# Patient Record
Sex: MALE | Race: BLACK OR AFRICAN AMERICAN | Employment: PART TIME | URBAN - METROPOLITAN AREA
[De-identification: names, ages, dates, MRNs, and addresses within clinical notes are randomized per-mention and may not be internally consistent; named-entity substitution may affect disease eponyms.]

---

## 2018-01-22 ENCOUNTER — APPOINTMENT (OUTPATIENT)
Dept: GENERAL RADIOLOGY | Age: 48
End: 2018-01-22
Attending: EMERGENCY MEDICINE
Payer: SELF-PAY

## 2018-01-22 ENCOUNTER — HOSPITAL ENCOUNTER (EMERGENCY)
Age: 48
Discharge: HOME | End: 2018-01-22
Attending: EMERGENCY MEDICINE | Admitting: EMERGENCY MEDICINE
Payer: SELF-PAY

## 2018-01-22 VITALS
BODY MASS INDEX: 25.48 KG/M2 | WEIGHT: 172 LBS | SYSTOLIC BLOOD PRESSURE: 124 MMHG | RESPIRATION RATE: 18 BRPM | HEART RATE: 93 BPM | HEIGHT: 69 IN | TEMPERATURE: 98.2 F | OXYGEN SATURATION: 98 % | DIASTOLIC BLOOD PRESSURE: 78 MMHG

## 2018-01-22 DIAGNOSIS — M25.561 ACUTE PAIN OF RIGHT KNEE: Primary | ICD-10-CM

## 2018-01-22 DIAGNOSIS — M25.461 EFFUSION OF RIGHT KNEE: ICD-10-CM

## 2018-01-22 PROCEDURE — 99282 EMERGENCY DEPT VISIT SF MDM: CPT

## 2018-01-22 PROCEDURE — 73564 X-RAY EXAM KNEE 4 OR MORE: CPT

## 2018-01-22 RX ORDER — HYDROCODONE BITARTRATE AND ACETAMINOPHEN 5; 325 MG/1; MG/1
TABLET ORAL
Qty: 8 TAB | Refills: 0 | Status: SHIPPED | OUTPATIENT
Start: 2018-01-22 | End: 2019-04-29 | Stop reason: ALTCHOICE

## 2018-01-22 NOTE — DISCHARGE INSTRUCTIONS
Knee Pain or Injury: Care Instructions  Your Care Instructions    Injuries are a common cause of knee problems. Sudden (acute) injuries may be caused by a direct blow to the knee. They can also be caused by abnormal twisting, bending, or falling on the knee. Pain, bruising, or swelling may be severe, and may start within minutes of the injury. Overuse is another cause of knee pain. Other causes are climbing stairs, kneeling, and other activities that use the knee. Everyday wear and tear, especially as you get older, also can cause knee pain. Rest, along with home treatment, often relieves pain and allows your knee to heal. If you have a serious knee injury, you may need tests and treatment. Follow-up care is a key part of your treatment and safety. Be sure to make and go to all appointments, and call your doctor if you are having problems. It's also a good idea to know your test results and keep a list of the medicines you take. How can you care for yourself at home? · Be safe with medicines. Read and follow all instructions on the label. ¨ If the doctor gave you a prescription medicine for pain, take it as prescribed. ¨ If you are not taking a prescription pain medicine, ask your doctor if you can take an over-the-counter medicine. · Rest and protect your knee. Take a break from any activity that may cause pain. · Put ice or a cold pack on your knee for 10 to 20 minutes at a time. Put a thin cloth between the ice and your skin. · Prop up a sore knee on a pillow when you ice it or anytime you sit or lie down for the next 3 days. Try to keep it above the level of your heart. This will help reduce swelling. · If your knee is not swollen, you can put moist heat, a heating pad, or a warm cloth on your knee. · If your doctor recommends an elastic bandage, sleeve, or other type of support for your knee, wear it as directed.   · Follow your doctor's instructions about how much weight you can put on your leg. Use a cane, crutches, or a walker as instructed. · Follow your doctor's instructions about activity during your healing process. If you can do mild exercise, slowly increase your activity. · Reach and stay at a healthy weight. Extra weight can strain the joints, especially the knees and hips, and make the pain worse. Losing even a few pounds may help. When should you call for help? Call 911 anytime you think you may need emergency care. For example, call if:  ? · You have symptoms of a blood clot in your lung (called a pulmonary embolism). These may include:  ¨ Sudden chest pain. ¨ Trouble breathing. ¨ Coughing up blood. ?Call your doctor now or seek immediate medical care if:  ? · You have severe or increasing pain. ? · Your leg or foot turns cold or changes color. ? · You cannot stand or put weight on your knee. ? · Your knee looks twisted or bent out of shape. ? · You cannot move your knee. ? · You have signs of infection, such as:  ¨ Increased pain, swelling, warmth, or redness. ¨ Red streaks leading from the knee. ¨ Pus draining from a place on your knee. ¨ A fever. ? · You have signs of a blood clot in your leg (called a deep vein thrombosis), such as:  ¨ Pain in your calf, back of the knee, thigh, or groin. ¨ Redness and swelling in your leg or groin. ? Watch closely for changes in your health, and be sure to contact your doctor if:  ? · You have tingling, weakness, or numbness in your knee. ? · You have any new symptoms, such as swelling. ? · You have bruises from a knee injury that last longer than 2 weeks. ? · You do not get better as expected. Where can you learn more? Go to http://leonides-mason.info/. Enter K195 in the search box to learn more about \"Knee Pain or Injury: Care Instructions. \"  Current as of: March 20, 2017  Content Version: 11.4  © 2947-0127 [employer].  Care instructions adapted under license by [employer] (which disclaims liability or warranty for this information). If you have questions about a medical condition or this instruction, always ask your healthcare professional. [address] any warranty or liability for your use of this information. Joint Pain: Care Instructions  Your Care Instructions    Many people have small aches and pains from overuse or injury to muscles and joints. Joint injuries often happen during sports or recreation, work tasks, or projects around the home. An overuse injury can happen when you put too much stress on a joint or when you do an activity that stresses the joint over and over, such as using the computer or rowing a boat. You can take action at home to help your muscles and joints get better. You should feel better in 1 to 2 weeks, but it can take 3 months or more to heal completely. Follow-up care is a key part of your treatment and safety. Be sure to make and go to all appointments, and call your doctor if you are having problems. It's also a good idea to know your test results and keep a list of the medicines you take. How can you care for yourself at home? · Do not put weight on the injured joint for at least a day or two. · For the first day or two after an injury, do not take hot showers or baths, and do not use hot packs. The heat could make swelling worse. · Put ice or a cold pack on the sore joint for 10 to 20 minutes at a time. Try to do this every 1 to 2 hours for the next 3 days (when you are awake) or until the swelling goes down. Put a thin cloth between the ice and your skin. · Wrap the injury in an elastic bandage. Do not wrap it too tightly because this can cause more swelling. · Prop up the sore joint on a pillow when you ice it or anytime you sit or lie down during the next 3 days. Try to keep it above the level of your heart. This will help reduce swelling.   · Take an over-the-counter pain medicine, such as acetaminophen (Tylenol), ibuprofen (Advil, Motrin), or naproxen (Aleve). Read and follow all instructions on the label. · After 1 or 2 days of rest, begin moving the joint gently. While the joint is still healing, you can begin to exercise using activities that do not strain or hurt the painful joint. When should you call for help? Call your doctor now or seek immediate medical care if:  ? · You have signs of infection, such as:  ¨ Increased pain, swelling, warmth, and redness. ¨ Red streaks leading from the joint. ¨ A fever. ? Watch closely for changes in your health, and be sure to contact your doctor if:  ? · Your movement or symptoms are not getting better after 1 to 2 weeks of home treatment. Where can you learn more? Go to http://leonides-mason.info/. Enter P205 in the search box to learn more about \"Joint Pain: Care Instructions. \"  Current as of: March 21, 2017  Content Version: 11.4  © 0129-6211 [employer]. Care instructions adapted under license by [employer] (which disclaims liability or warranty for this information). If you have questions about a medical condition or this instruction, always ask your healthcare professional. [place] [address] any warranty or liability for your use of this information. Knee Pain or Injury: Care Instructions  Your Care Instructions    Injuries are a common cause of knee problems. Sudden (acute) injuries may be caused by a direct blow to the knee. They can also be caused by abnormal twisting, bending, or falling on the knee. Pain, bruising, or swelling may be severe, and may start within minutes of the injury. Overuse is another cause of knee pain. Other causes are climbing stairs, kneeling, and other activities that use the knee. Everyday wear and tear, especially as you get older, also can cause knee pain.   Rest, along with home treatment, often relieves pain and allows your knee to heal. If you have a serious knee injury, you may need tests and treatment. Follow-up care is a key part of your treatment and safety. Be sure to make and go to all appointments, and call your doctor if you are having problems. It's also a good idea to know your test results and keep a list of the medicines you take. How can you care for yourself at home? · Be safe with medicines. Read and follow all instructions on the label. ¨ If the doctor gave you a prescription medicine for pain, take it as prescribed. ¨ If you are not taking a prescription pain medicine, ask your doctor if you can take an over-the-counter medicine. · Rest and protect your knee. Take a break from any activity that may cause pain. · Put ice or a cold pack on your knee for 10 to 20 minutes at a time. Put a thin cloth between the ice and your skin. · Prop up a sore knee on a pillow when you ice it or anytime you sit or lie down for the next 3 days. Try to keep it above the level of your heart. This will help reduce swelling. · If your knee is not swollen, you can put moist heat, a heating pad, or a warm cloth on your knee. · If your doctor recommends an elastic bandage, sleeve, or other type of support for your knee, wear it as directed. · Follow your doctor's instructions about how much weight you can put on your leg. Use a cane, crutches, or a walker as instructed. · Follow your doctor's instructions about activity during your healing process. If you can do mild exercise, slowly increase your activity. · Reach and stay at a healthy weight. Extra weight can strain the joints, especially the knees and hips, and make the pain worse. Losing even a few pounds may help. When should you call for help? Call 911 anytime you think you may need emergency care. For example, call if:  ? · You have symptoms of a blood clot in your lung (called a pulmonary embolism). These may include:  ¨ Sudden chest pain. ¨ Trouble breathing. ¨ Coughing up blood.    ?Call your doctor now or seek immediate medical care if:  ? · You have severe or increasing pain. ? · Your leg or foot turns cold or changes color. ? · You cannot stand or put weight on your knee. ? · Your knee looks twisted or bent out of shape. ? · You cannot move your knee. ? · You have signs of infection, such as:  ¨ Increased pain, swelling, warmth, or redness. ¨ Red streaks leading from the knee. ¨ Pus draining from a place on your knee. ¨ A fever. ? · You have signs of a blood clot in your leg (called a deep vein thrombosis), such as:  ¨ Pain in your calf, back of the knee, thigh, or groin. ¨ Redness and swelling in your leg or groin. ? Watch closely for changes in your health, and be sure to contact your doctor if:  ? · You have tingling, weakness, or numbness in your knee. ? · You have any new symptoms, such as swelling. ? · You have bruises from a knee injury that last longer than 2 weeks. ? · You do not get better as expected. Where can you learn more? Go to http://leonides-mason.info/. Enter K195 in the search box to learn more about \"Knee Pain or Injury: Care Instructions. \"  Current as of: March 20, 2017  Content Version: 11.4  © 9324-0888 [employer]. Care instructions adapted under license by [employer] (which disclaims liability or warranty for this information). If you have questions about a medical condition or this instruction, always ask your healthcare professional. [place] [address] any warranty or liability for your use of this information.

## 2018-01-22 NOTE — ED PROVIDER NOTES
Patient is a 49 y.o. male presenting with knee pain. The history is provided by the patient. Knee Pain    This is a chronic problem. Episode onset: 3 weeks. The problem occurs constantly. The problem has not changed since onset. The pain is present in the right knee. The quality of the pain is described as aching. The pain is moderate. Associated symptoms include limited range of motion. Pertinent negatives include no numbness, no stiffness, no tingling, no itching, no back pain and no neck pain. The symptoms are aggravated by heat. He has tried nothing for the symptoms. The treatment provided no relief. There has been no history of extremity trauma. History reviewed. No pertinent past medical history. History reviewed. No pertinent surgical history. Family History:   Problem Relation Age of Onset    Stroke Mother     Hypertension Mother     No Known Problems Father        Social History     Social History    Marital status: SINGLE     Spouse name: N/A    Number of children: N/A    Years of education: N/A     Occupational History    Not on file. Social History Main Topics    Smoking status: Light Tobacco Smoker    Smokeless tobacco: Never Used    Alcohol use Yes    Drug use: No    Sexual activity: Not on file     Other Topics Concern    Not on file     Social History Narrative         ALLERGIES: Review of patient's allergies indicates no known allergies. Review of Systems   Musculoskeletal: Negative for back pain, neck pain and stiffness. Skin: Negative for itching. Neurological: Negative for tingling and numbness. Vitals:    01/22/18 1106   BP: 124/78   Pulse: 93   Resp: 18   Temp: 98.2 °F (36.8 °C)   SpO2: 98%   Weight: 78 kg (172 lb)   Height: 5' 9\" (1.753 m)            Physical Exam   Constitutional: He is oriented to person, place, and time. He appears well-developed and well-nourished. HENT:   Head: Normocephalic and atraumatic.    Mouth/Throat: Oropharynx is clear and moist.   Eyes: Conjunctivae are normal.   Neck: Normal range of motion. Cardiovascular: Normal rate, regular rhythm and normal heart sounds. Pulmonary/Chest: Effort normal. No respiratory distress. He has no wheezes. He has no rales. Abdominal: Soft. He exhibits no distension. Musculoskeletal:        Right knee: He exhibits bony tenderness. He exhibits normal range of motion, no swelling, no effusion, no ecchymosis, no deformity, no laceration, no erythema, normal alignment, no LCL laxity, normal patellar mobility, normal meniscus and no MCL laxity. No tenderness found. Limited ROM due to pain, no redness, swelling or concern for joint infection, effusion  Ligaments stable. All tests of knee WNL. Neurological: He is oriented to person, place, and time. Nursing note and vitals reviewed. MDM  Number of Diagnoses or Management Options  Acute pain of right knee:   Effusion of right knee:   Diagnosis management comments: Xray reviewed, effusion noted on xray, no fracutre/dislocation. Ace wrap, norco for pain (few tabs) and pcp f/u. Will also refer to ortho for further work up/management.         Amount and/or Complexity of Data Reviewed  Tests in the radiology section of CPT®: reviewed and ordered      ED Course       Procedures

## 2019-01-03 ENCOUNTER — HOSPITAL ENCOUNTER (EMERGENCY)
Age: 49
Discharge: HOME | End: 2019-01-03
Attending: EMERGENCY MEDICINE
Payer: COMMERCIAL

## 2019-01-03 ENCOUNTER — APPOINTMENT (OUTPATIENT)
Dept: GENERAL RADIOLOGY | Age: 49
End: 2019-01-03
Attending: PHYSICIAN ASSISTANT
Payer: COMMERCIAL

## 2019-01-03 VITALS
HEART RATE: 93 BPM | BODY MASS INDEX: 26.36 KG/M2 | DIASTOLIC BLOOD PRESSURE: 93 MMHG | OXYGEN SATURATION: 99 % | HEIGHT: 69 IN | WEIGHT: 178 LBS | RESPIRATION RATE: 17 BRPM | TEMPERATURE: 98.4 F | SYSTOLIC BLOOD PRESSURE: 143 MMHG

## 2019-01-03 DIAGNOSIS — J20.9 ACUTE BRONCHITIS, UNSPECIFIED ORGANISM: Primary | ICD-10-CM

## 2019-01-03 DIAGNOSIS — R68.89 FLU-LIKE SYMPTOMS: ICD-10-CM

## 2019-01-03 LAB
FLUAV AG NPH QL IA: NEGATIVE
FLUBV AG NOSE QL IA: NEGATIVE

## 2019-01-03 PROCEDURE — 71046 X-RAY EXAM CHEST 2 VIEWS: CPT

## 2019-01-03 PROCEDURE — 87804 INFLUENZA ASSAY W/OPTIC: CPT

## 2019-01-03 PROCEDURE — 99283 EMERGENCY DEPT VISIT LOW MDM: CPT

## 2019-01-03 RX ORDER — AZITHROMYCIN 250 MG/1
TABLET, FILM COATED ORAL
Qty: 6 TAB | Refills: 0 | Status: SHIPPED | OUTPATIENT
Start: 2019-01-03 | End: 2019-04-29 | Stop reason: ALTCHOICE

## 2019-01-03 RX ORDER — CODEINE PHOSPHATE AND GUAIFENESIN 10; 100 MG/5ML; MG/5ML
SOLUTION ORAL
Qty: 120 ML | Refills: 0 | Status: SHIPPED | OUTPATIENT
Start: 2019-01-03 | End: 2019-04-29 | Stop reason: ALTCHOICE

## 2019-01-03 NOTE — ED TRIAGE NOTES
Pt arrived c/o sore throat, generalized body aches, feels like ears hurt. States he is having sweats and chills, took nyquil and tylenol at home.

## 2019-01-03 NOTE — DISCHARGE INSTRUCTIONS
Patient Education        Bronchitis: Care Instructions  Your Care Instructions    Bronchitis is inflammation of the bronchial tubes, which carry air to the lungs. The tubes swell and produce mucus, or phlegm. The mucus and inflamed bronchial tubes make you cough. You may have trouble breathing. Most cases of bronchitis are caused by viruses like those that cause colds. Antibiotics usually do not help and they may be harmful. Bronchitis usually develops rapidly and lasts about 2 to 3 weeks in otherwise healthy people. Follow-up care is a key part of your treatment and safety. Be sure to make and go to all appointments, and call your doctor if you are having problems. It's also a good idea to know your test results and keep a list of the medicines you take. How can you care for yourself at home? · Take all medicines exactly as prescribed. Call your doctor if you think you are having a problem with your medicine. · Get some extra rest.  · Take an over-the-counter pain medicine, such as acetaminophen (Tylenol), ibuprofen (Advil, Motrin), or naproxen (Aleve) to reduce fever and relieve body aches. Read and follow all instructions on the label. · Do not take two or more pain medicines at the same time unless the doctor told you to. Many pain medicines have acetaminophen, which is Tylenol. Too much acetaminophen (Tylenol) can be harmful. · Take an over-the-counter cough medicine that contains dextromethorphan to help quiet a dry, hacking cough so that you can sleep. Avoid cough medicines that have more than one active ingredient. Read and follow all instructions on the label. · Breathe moist air from a humidifier, hot shower, or sink filled with hot water. The heat and moisture will thin mucus so you can cough it out. · Do not smoke. Smoking can make bronchitis worse. If you need help quitting, talk to your doctor about stop-smoking programs and medicines.  These can increase your chances of quitting for good.  When should you call for help? Call 911 anytime you think you may need emergency care. For example, call if:    · You have severe trouble breathing.    Call your doctor now or seek immediate medical care if:    · You have new or worse trouble breathing.     · You cough up dark brown or bloody mucus (sputum).     · You have a new or higher fever.     · You have a new rash.    Watch closely for changes in your health, and be sure to contact your doctor if:    · You cough more deeply or more often, especially if you notice more mucus or a change in the color of your mucus.     · You are not getting better as expected. Where can you learn more? Go to http://leonides-mason.info/. Enter H333 in the search box to learn more about \"Bronchitis: Care Instructions. \"  Current as of: December 6, 2017  Content Version: 11.8  © 0809-6459 [employer]. Care instructions adapted under license by [employer] (which disclaims liability or warranty for this information). If you have questions about a medical condition or this instruction, always ask your healthcare professional. [address] any warranty or liability for your use of this information.

## 2019-01-03 NOTE — ED PROVIDER NOTES
EMERGENCY DEPARTMENT HISTORY AND PHYSICAL EXAM 
 
12:19 PM 
 
 
Date: 1/3/2019 Patient Name: Adeel Watts History of Presenting Illness Chief Complaint Patient presents with  Sore Throat  Generalized Body Aches History Provided By: Patient Chief Complaint: sore throat Duration:  1.5 days Timing:  Constant Location: throat Quality: scratchy Severity: Mild Modifying Factors: He has taken Nyquil with no relief of sx. Associated Symptoms: cough, diaphoresis, generalized body aches, congestion, and sneezing Additional History (Context): Adeel Watts is a 51 y.o. male with No significant past medical history who presents with constant mild scratchy sore throat for the past 1.5 days with associated cough, diaphoresis, generalized body aches, congestion, and sneezing. He has taken Nyquil with no relief of sx  Pt drinks and smokes. PCP: None Current Outpatient Medications Medication Sig Dispense Refill  
 HYDROcodone-acetaminophen (NORCO) 5-325 mg per tablet Take 1-2 tablets PO every 4-6 hours as needed for pain control. If over the counter ibuprofen or acetaminophen was suggested, then only take the vicodin for pain not well controlled with the over the counter medication. 8 Tab 0  
 HYDROcodone-acetaminophen (NORCO) 5-325 mg per tablet Take 1-2 tablets PO every 4-6 hours as needed for pain control. If over the counter ibuprofen or acetaminophen was suggested, then only take the vicodin for pain not well controlled with the over the counter medication. 20 Tab 0  ibuprofen (MOTRIN) 600 mg tablet Take 1 Tab by mouth every six (6) hours as needed for Pain. 20 Tab 0 Past History Past Surgical History: No past surgical history on file. Family History: 
Family History Problem Relation Age of Onset  Stroke Mother  Hypertension Mother  No Known Problems Father Social History: 
Social History Tobacco Use  
  Smoking status: Light Tobacco Smoker  Smokeless tobacco: Never Used Substance Use Topics  Alcohol use: Yes  Drug use: No  
 
 
Allergies: 
No Known Allergies Review of Systems Review of Systems Constitutional: Positive for diaphoresis. Positive for generalized body aches HENT: Positive for congestion, sneezing and sore throat. Respiratory: Positive for cough. Negative for shortness of breath and wheezing. Cardiovascular: Negative. Gastrointestinal: Negative. Neurological: Negative. All other systems reviewed and are negative. Physical Exam  
 
Visit Vitals BP (!) 143/93 (BP 1 Location: Left arm, BP Patient Position: At rest) Pulse 93 Temp 98.4 °F (36.9 °C) Resp 17 Ht 5' 9\" (1.753 m) Wt 80.7 kg (178 lb) SpO2 99% BMI 26.29 kg/m² Physical Exam  
Constitutional: He is oriented to person, place, and time. He appears well-developed and well-nourished. No distress. HENT:  
Head: Normocephalic and atraumatic. Right Ear: Hearing, tympanic membrane, external ear and ear canal normal.  
Left Ear: Hearing, tympanic membrane, external ear and ear canal normal.  
Nose: Nose normal.  
Mouth/Throat: Uvula is midline, oropharynx is clear and moist and mucous membranes are normal.  
Eyes: Conjunctivae and EOM are normal. Pupils are equal, round, and reactive to light. Right eye exhibits no discharge. Left eye exhibits no discharge. Neck: Normal range of motion. Neck supple. Cardiovascular: Normal rate and regular rhythm. Pulmonary/Chest: Effort normal and breath sounds normal. He has no wheezes. Musculoskeletal: Normal range of motion. Lymphadenopathy:  
  He has no cervical adenopathy. Neurological: He is alert and oriented to person, place, and time. Skin: Skin is warm and dry. He is not diaphoretic. Psychiatric: He has a normal mood and affect. Diagnostic Study Results Labs - 
 No results found for this or any previous visit (from the past 12 hour(s)). Radiologic Studies - No orders to display Medical Decision Making I am the first provider for this patient. I reviewed the vital signs, available nursing notes, past medical history, past surgical history, family history and social history. Vital Signs-Reviewed the patient's vital signs. Pulse Oximetry Analysis -  99% on room air Records Reviewed: Nursing Notes (Time of Review: 12:19 PM) ED Course: Progress Notes, Reevaluation, and Consults: 
 
Provider Notes (Medical Decision Making): pt c/o productive cough, ST, body aches. Flu neg. Chest xray neg. Smoker. Likely bronchitis. Afeb however took meds pta. Vs normal. Supportive treatment. Diagnosis Clinical Impression: No diagnosis found. Disposition: home Follow-up Information None Medication List  
  
ASK your doctor about these medications * HYDROcodone-acetaminophen 5-325 mg per tablet Commonly known as:  Billye [place] Take 1-2 tablets PO every 4-6 hours as needed for pain control. If over the counter ibuprofen or acetaminophen was suggested, then only take the vicodin for pain not well controlled with the over the counter medication. * HYDROcodone-acetaminophen 5-325 mg per tablet Commonly known as:  Billye [place] Take 1-2 tablets PO every 4-6 hours as needed for pain control. If over the counter ibuprofen or acetaminophen was suggested, then only take the vicodin for pain not well controlled with the over the counter medication. ibuprofen 600 mg tablet Commonly known as:  MOTRIN Take 1 Tab by mouth every six (6) hours as needed for Pain. * This list has 2 medication(s) that are the same as other medications prescribed for you. Read the directions carefully, and ask your doctor or other care provider to review them with you.  
  
  
  
 
_______________________________ Scribe Attestation Ej Plata acting as a scribe for and in the presence of [employer], SEBASTIÁN January 03, 2019 at 12:23 PM 
    
Provider Attestation:     
I personally performed the services described in the documentation, reviewed the documentation, as recorded by the scribe in my presence, and it accurately and completely records my words and actions. January 03, 2019 at 12:23 PM - [employer], SEBASTIÁN   
 
 
_______________________________

## 2019-01-03 NOTE — LETTER
[address] [place] [address] [facility] EMERGENCY DEPT 
[address]  [place] [address] 
[phone] Work/School Note Date: 1/3/2019 To Whom It May concern: 
 
Eloise Herrera was seen and treated today in the emergency room by the following provider(s): 
Attending Provider: Danae Adams MD 
Physician Assistant: Nayeli Nava. Eloise Herrera may return to work on 1/5/19. Sincerely, JOLLY Ureña

## 2019-04-29 ENCOUNTER — OFFICE VISIT (OUTPATIENT)
Dept: FAMILY MEDICINE CLINIC | Age: 49
End: 2019-04-29

## 2019-04-29 VITALS
HEIGHT: 69 IN | TEMPERATURE: 97.6 F | HEART RATE: 78 BPM | SYSTOLIC BLOOD PRESSURE: 116 MMHG | RESPIRATION RATE: 18 BRPM | BODY MASS INDEX: 27.43 KG/M2 | DIASTOLIC BLOOD PRESSURE: 66 MMHG | WEIGHT: 185.2 LBS | OXYGEN SATURATION: 97 %

## 2019-04-29 DIAGNOSIS — M25.561 CHRONIC PAIN OF RIGHT KNEE: Primary | ICD-10-CM

## 2019-04-29 DIAGNOSIS — Z71.6 ENCOUNTER FOR SMOKING CESSATION COUNSELING: ICD-10-CM

## 2019-04-29 DIAGNOSIS — F33.1 MODERATE EPISODE OF RECURRENT MAJOR DEPRESSIVE DISORDER (HCC): ICD-10-CM

## 2019-04-29 DIAGNOSIS — G89.29 CHRONIC PAIN OF RIGHT KNEE: Primary | ICD-10-CM

## 2019-04-29 RX ORDER — MELOXICAM 7.5 MG/1
7.5 TABLET ORAL DAILY
Qty: 30 TAB | Refills: 0 | Status: SHIPPED | OUTPATIENT
Start: 2019-04-29 | End: 2019-04-29 | Stop reason: SDUPTHER

## 2019-04-29 RX ORDER — BUPROPION HYDROCHLORIDE 150 MG/1
150 TABLET ORAL
Qty: 30 TAB | Refills: 0 | Status: SHIPPED | OUTPATIENT
Start: 2019-04-29 | End: 2022-09-28

## 2019-04-29 RX ORDER — MELOXICAM 7.5 MG/1
7.5 TABLET ORAL DAILY
Qty: 30 TAB | Refills: 0 | Status: SHIPPED | OUTPATIENT
Start: 2019-04-29 | End: 2022-09-28

## 2019-04-29 NOTE — PATIENT INSTRUCTIONS
Knee Pain or Injury: Care Instructions  Your Care Instructions    Injuries are a common cause of knee problems. Sudden (acute) injuries may be caused by a direct blow to the knee. They can also be caused by abnormal twisting, bending, or falling on the knee. Pain, bruising, or swelling may be severe, and may start within minutes of the injury. Overuse is another cause of knee pain. Other causes are climbing stairs, kneeling, and other activities that use the knee. Everyday wear and tear, especially as you get older, also can cause knee pain. Rest, along with home treatment, often relieves pain and allows your knee to heal. If you have a serious knee injury, you may need tests and treatment. Follow-up care is a key part of your treatment and safety. Be sure to make and go to all appointments, and call your doctor if you are having problems. It's also a good idea to know your test results and keep a list of the medicines you take. How can you care for yourself at home? · Be safe with medicines. Read and follow all instructions on the label. ? If the doctor gave you a prescription medicine for pain, take it as prescribed. ? If you are not taking a prescription pain medicine, ask your doctor if you can take an over-the-counter medicine. · Rest and protect your knee. Take a break from any activity that may cause pain. · Put ice or a cold pack on your knee for 10 to 20 minutes at a time. Put a thin cloth between the ice and your skin. · Prop up a sore knee on a pillow when you ice it or anytime you sit or lie down for the next 3 days. Try to keep it above the level of your heart. This will help reduce swelling. · If your knee is not swollen, you can put moist heat, a heating pad, or a warm cloth on your knee. · If your doctor recommends an elastic bandage, sleeve, or other type of support for your knee, wear it as directed.   · Follow your doctor's instructions about how much weight you can put on your leg. Use a cane, crutches, or a walker as instructed. · Follow your doctor's instructions about activity during your healing process. If you can do mild exercise, slowly increase your activity. · Reach and stay at a healthy weight. Extra weight can strain the joints, especially the knees and hips, and make the pain worse. Losing even a few pounds may help. When should you call for help? Call 911 anytime you think you may need emergency care. For example, call if:    · You have symptoms of a blood clot in your lung (called a pulmonary embolism). These may include:  ? Sudden chest pain. ? Trouble breathing. ? Coughing up blood.    Call your doctor now or seek immediate medical care if:    · You have severe or increasing pain.     · Your leg or foot turns cold or changes color.     · You cannot stand or put weight on your knee.     · Your knee looks twisted or bent out of shape.     · You cannot move your knee.     · You have signs of infection, such as:  ? Increased pain, swelling, warmth, or redness. ? Red streaks leading from the knee. ? Pus draining from a place on your knee. ? A fever.     · You have signs of a blood clot in your leg (called a deep vein thrombosis), such as:  ? Pain in your calf, back of the knee, thigh, or groin. ? Redness and swelling in your leg or groin.    Watch closely for changes in your health, and be sure to contact your doctor if:    · You have tingling, weakness, or numbness in your knee.     · You have any new symptoms, such as swelling.     · You have bruises from a knee injury that last longer than 2 weeks.     · You do not get better as expected. Where can you learn more? Go to http://leonides-mason.info/. Enter K195 in the search box to learn more about \"Knee Pain or Injury: Care Instructions. \"  Current as of: September 23, 2018  Content Version: 11.9  © 4163-6238 [employer].  Care instructions adapted under license by [facility] (which disclaims liability or warranty for this information). If you have questions about a medical condition or this instruction, always ask your healthcare professional. [address] any warranty or liability for your use of this information. Recovering From Depression: Care Instructions  Your Care Instructions    Taking good care of yourself is important as you recover from depression. In time, your symptoms will fade as your treatment takes hold. Do not give up. Instead, focus your energy on getting better. Your mood will improve. It just takes some time. Focus on things that can help you feel better, such as being with friends and family, eating well, and getting enough rest. But take things slowly. Do not do too much too soon. You will begin to feel better gradually. Follow-up care is a key part of your treatment and safety. Be sure to make and go to all appointments, and call your doctor if you are having problems. It's also a good idea to know your test results and keep a list of the medicines you take. How can you care for yourself at home? Be realistic  · If you have a large task to do, break it up into smaller steps you can handle, and just do what you can. · You may want to put off important decisions until your depression has lifted. If you have plans that will have a major impact on your life, such as marriage, divorce, or a job change, try to wait a bit. Talk it over with friends and loved ones who can help you look at the overall picture first.  · Reaching out to people for help is important. Do not isolate yourself. Let your family and friends help you. Find someone you can trust and confide in, and talk to that person. · Be patient, and be kind to yourself. Remember that depression is not your fault and is not something you can overcome with willpower alone. Treatment is necessary for depression, just like for any other illness.  Feeling better takes time, and your mood will improve little by little. Stay active  · Stay busy and get outside. Take a walk, or try some other light exercise. · Talk with your doctor about an exercise program. Exercise can help with mild depression. · Go to a movie or concert. Take part in a [employer] activity or other social gathering. Go to a ball game. · Ask a friend to have dinner with you. Take care of yourself  · Eat a balanced diet with plenty of fresh fruits and vegetables, whole grains, and lean protein. If you have lost your appetite, eat small snacks rather than large meals. · Avoid drinking alcohol or using illegal drugs. Do not take medicines that have not been prescribed for you. They may interfere with medicines you may be taking for depression, or they may make your depression worse. · Take your medicines exactly as they are prescribed. You may start to feel better within 1 to 3 weeks of taking antidepressant medicine. But it can take as many as 6 to 8 weeks to see more improvement. If you have questions or concerns about your medicines, or if you do not notice any improvement by 3 weeks, talk to your doctor. · If you have any side effects from your medicine, tell your doctor. Antidepressants can make you feel tired, dizzy, or nervous. Some people have dry mouth, constipation, headaches, sexual problems, or diarrhea. Many of these side effects are mild and will go away on their own after you have been taking the medicine for a few weeks. Some may last longer. Talk to your doctor if side effects are bothering you too much. You might be able to try a different medicine. · Get enough sleep. If you have problems sleeping:  ? Go to bed at the same time every night, and get up at the same time every morning. ? Keep your bedroom dark and quiet. ? Do not exercise after 5:00 p.m.  ? Avoid drinks with caffeine after 5:00 p.m. · Avoid sleeping pills unless they are prescribed by the doctor treating your depression. Sleeping pills may make you groggy during the day, and they may interact with other medicine you are taking. · If you have any other illnesses, such as diabetes, heart disease, or high blood pressure, make sure to continue with your treatment. Tell your doctor about all of the medicines you take, including those with or without a prescription. · Keep the numbers for these national suicide hotlines: 4-032-924-TALK (7-913.579.9038) and 5-508-CQUBPQM (7-248.611.6879). If you or someone you know talks about suicide or feeling hopeless, get help right away. When should you call for help? Call 911 anytime you think you may need emergency care. For example, call if:    · You feel like hurting yourself or someone else.     · Someone you know has depression and is about to attempt or is attempting suicide.   [facility] your doctor now or seek immediate medical care if:    · You hear voices.     · Someone you know has depression and:  ? Starts to give away his or her possessions. ? Uses illegal drugs or drinks alcohol heavily. ? Talks or writes about death, including writing suicide notes or talking about guns, knives, or pills. ? Starts to spend a lot of time alone. ? Acts very aggressively or suddenly appears calm.    Watch closely for changes in your health, and be sure to contact your doctor if:    · You do not get better as expected. Where can you learn more? Go to http://leonides-mason.info/. Enter O728 in the search box to learn more about \"Recovering From Depression: Care Instructions. \"  Current as of: September 11, 2018  Content Version: 11.9  © 6308-5076 [employer]. Care instructions adapted under license by [employer] (which disclaims liability or warranty for this information).  If you have questions about a medical condition or this instruction, always ask your healthcare professional. [address] any warranty or liability for your use of this information.

## 2019-04-29 NOTE — PROGRESS NOTES
HPI  Qasim Ball is a 51 y.o. male who presents today for recurrent right knee pain. Pt notes he had right knee pain and swelling in January 2019 lasting about a week or two. Pt notes the current right knee pain has been going on for about 3 weeks. 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 Pt rates the pain a 7/10 and at it's worst the pain over 10/10 and pain has awakened him. Pt denies any injury in past or present to right knee. Pain is described as intermittent and throbbing \"feels like I had an extra knee cap\". PHQ 9 Score: 10 (4/29/2019 10:51 AM)  DAISHA-7 Score: 20 \"somewhat difficult\"    No Known Allergies    SUBJECTIVE:  Review of Systems   Constitutional: Negative for chills, fever and malaise/fatigue. Eyes: Negative for blurred vision. Respiratory: Negative for shortness of breath. Cardiovascular: Negative for chest pain, palpitations and leg swelling. Gastrointestinal: Negative for abdominal pain, diarrhea, nausea and vomiting. Genitourinary: Negative for dysuria, frequency and urgency. Musculoskeletal: Positive for joint pain (right knee). Negative for falls. Neurological: Negative for dizziness and headaches. Psychiatric/Behavioral: Positive for depression. Negative for suicidal ideas. The patient is nervous/anxious. The patient does not have insomnia. OBJECTIVE:  Visit Vitals  /66 (BP 1 Location: Left arm, BP Patient Position: Sitting)   Pulse 78   Temp 97.6 °F (36.4 °C) (Oral)   Resp 18   Ht 5' 9\" (1.753 m)   Wt 185 lb 3.2 oz (84 kg)   SpO2 97%   BMI 27.35 kg/m²      I have reviewed/discussed the above normal BMI with the patient. I have recommended the following interventions: dietary management education, guidance, and counseling, encourage exercise and monitor weight .      Physical Exam   Constitutional: He is oriented to person, place, and time and well-developed, well-nourished, and in no distress. HENT:   Head: Normocephalic. Eyes: Pupils are equal, round, and reactive to light. Conjunctivae and EOM are normal.   Neck: Normal range of motion. Neck supple. No thyromegaly present. Cardiovascular: Normal rate, regular rhythm and normal heart sounds. Pulmonary/Chest: Effort normal and breath sounds normal. He has no wheezes. He has no rales. He exhibits no tenderness. Musculoskeletal: He exhibits edema and tenderness (right knee). Neurological: He is alert and oriented to person, place, and time. Antalgic gait   Skin: Skin is warm and dry. No erythema. Psychiatric: His mood appears not anxious. He exhibits a depressed mood. He expresses no homicidal and no suicidal ideation. He has a flat affect. Nursing note and vitals reviewed. ASSESSMENT:  Diagnoses and all orders for this visit:    1. Chronic pain of right knee  -     XR KNEE RT MIN 4 V; Future  -     REFERRAL TO SPORTS MEDICINE  -     meloxicam (MOBIC) 7.5 mg tablet; Take 1 Tab by mouth daily. 2. Moderate episode of recurrent major depressive disorder (HCC)  -     buPROPion XL (WELLBUTRIN XL) 150 mg tablet; Take 1 Tab by mouth every morning. 3. Encounter for smoking cessation counseling  -     buPROPion XL (WELLBUTRIN XL) 150 mg tablet; Take 1 Tab by mouth every morning. PLAN:  PHQ-9 & DAISHA-7 today  Right knee x-ray today  Start Mobic 7.5mg for knee pain  Start Wellbutrin  mg for depression, smoking  The patient was counseled on the dangers of tobacco use, and was advised to quit. Reviewed strategies to maximize success, including removing cigarettes and smoking materials from environment, stress management and pharmacotherapy (Wellbutrin XL). I have discussed the diagnosis with the patient and the intended plan as seen in the above orders.   The patient has received an after-visit summary and questions were answered concerning future plans. I have discussed medication side effects and warnings with the patient as well. Patient will call for further questions. Follow-up and Dispositions    · Return in about 1 month (around 5/27/2019) for depression, knee pain follow up.        Deangelo Aguilera NP

## 2019-04-29 NOTE — PROGRESS NOTES
Chief Complaint   Patient presents with    Knee Pain    Depression     1. Have you been to the ER, urgent care clinic since your last visit? Hospitalized since your last visit? No    2. Have you seen or consulted any other health care providers outside of the [address] [place] [address] since your last visit? Include any pap smears or colon screening.  No

## 2021-07-09 ENCOUNTER — APPOINTMENT (OUTPATIENT)
Dept: GENERAL RADIOLOGY | Age: 51
End: 2021-07-09
Attending: PHYSICIAN ASSISTANT
Payer: MEDICAID

## 2021-07-09 ENCOUNTER — HOSPITAL ENCOUNTER (EMERGENCY)
Age: 51
Discharge: HOME | End: 2021-07-09
Attending: EMERGENCY MEDICINE
Payer: MEDICAID

## 2021-07-09 VITALS
HEIGHT: 69 IN | OXYGEN SATURATION: 99 % | DIASTOLIC BLOOD PRESSURE: 74 MMHG | HEART RATE: 89 BPM | BODY MASS INDEX: 26.66 KG/M2 | TEMPERATURE: 99.8 F | RESPIRATION RATE: 20 BRPM | WEIGHT: 180 LBS | SYSTOLIC BLOOD PRESSURE: 114 MMHG

## 2021-07-09 DIAGNOSIS — U07.1 COVID-19: Primary | ICD-10-CM

## 2021-07-09 LAB
ALBUMIN SERPL-MCNC: 3.8 G/DL (ref 3.4–5)
ALBUMIN/GLOB SERPL: 0.9 {RATIO} (ref 0.8–1.7)
ALP SERPL-CCNC: 80 U/L (ref 45–117)
ALT SERPL-CCNC: 31 U/L (ref 16–61)
ANION GAP SERPL CALC-SCNC: 6 MMOL/L (ref 3–18)
AST SERPL-CCNC: 23 U/L (ref 10–38)
BASOPHILS # BLD: 0 K/UL (ref 0–0.1)
BASOPHILS NFR BLD: 0 % (ref 0–2)
BILIRUB SERPL-MCNC: 0.3 MG/DL (ref 0.2–1)
BUN SERPL-MCNC: 11 MG/DL (ref 7–18)
BUN/CREAT SERPL: 10 (ref 12–20)
CALCIUM SERPL-MCNC: 8.9 MG/DL (ref 8.5–10.1)
CHLORIDE SERPL-SCNC: 104 MMOL/L (ref 100–111)
CO2 SERPL-SCNC: 29 MMOL/L (ref 21–32)
COVID-19 RAPID TEST, COVR: DETECTED
CREAT SERPL-MCNC: 1.12 MG/DL (ref 0.6–1.3)
DIFFERENTIAL METHOD BLD: ABNORMAL
EOSINOPHIL # BLD: 0 K/UL (ref 0–0.4)
EOSINOPHIL NFR BLD: 0 % (ref 0–5)
ERYTHROCYTE [DISTWIDTH] IN BLOOD BY AUTOMATED COUNT: 12.8 % (ref 11.6–14.5)
GLOBULIN SER CALC-MCNC: 4.1 G/DL (ref 2–4)
GLUCOSE SERPL-MCNC: 114 MG/DL (ref 74–99)
HCT VFR BLD AUTO: 42.9 % (ref 36–48)
HGB BLD-MCNC: 14.5 G/DL (ref 13–16)
LACTATE BLD-SCNC: 1.76 MMOL/L (ref 0.4–2)
LYMPHOCYTES # BLD: 0.6 K/UL (ref 0.9–3.6)
LYMPHOCYTES NFR BLD: 13 % (ref 21–52)
MCH RBC QN AUTO: 30.9 PG (ref 24–34)
MCHC RBC AUTO-ENTMCNC: 33.8 G/DL (ref 31–37)
MCV RBC AUTO: 91.5 FL (ref 74–97)
MONOCYTES # BLD: 0.3 K/UL (ref 0.05–1.2)
MONOCYTES NFR BLD: 6 % (ref 3–10)
NEUTS SEG # BLD: 3.7 K/UL (ref 1.8–8)
NEUTS SEG NFR BLD: 80 % (ref 40–73)
PLATELET # BLD AUTO: 187 K/UL (ref 135–420)
PMV BLD AUTO: 8.9 FL (ref 9.2–11.8)
POTASSIUM SERPL-SCNC: 4 MMOL/L (ref 3.5–5.5)
PROT SERPL-MCNC: 7.9 G/DL (ref 6.4–8.2)
RBC # BLD AUTO: 4.69 M/UL (ref 4.35–5.65)
SODIUM SERPL-SCNC: 139 MMOL/L (ref 136–145)
SOURCE, COVRS: ABNORMAL
WBC # BLD AUTO: 4.7 K/UL (ref 4.6–13.2)

## 2021-07-09 PROCEDURE — 83605 ASSAY OF LACTIC ACID: CPT

## 2021-07-09 PROCEDURE — 96374 THER/PROPH/DIAG INJ IV PUSH: CPT

## 2021-07-09 PROCEDURE — 99284 EMERGENCY DEPT VISIT MOD MDM: CPT

## 2021-07-09 PROCEDURE — 71045 X-RAY EXAM CHEST 1 VIEW: CPT

## 2021-07-09 PROCEDURE — 93005 ELECTROCARDIOGRAM TRACING: CPT

## 2021-07-09 PROCEDURE — 74011000258 HC RX REV CODE- 258: Performed by: PHYSICIAN ASSISTANT

## 2021-07-09 PROCEDURE — 87635 SARS-COV-2 COVID-19 AMP PRB: CPT

## 2021-07-09 PROCEDURE — 87040 BLOOD CULTURE FOR BACTERIA: CPT

## 2021-07-09 PROCEDURE — 80053 COMPREHEN METABOLIC PANEL: CPT

## 2021-07-09 PROCEDURE — 74011250636 HC RX REV CODE- 250/636: Performed by: PHYSICIAN ASSISTANT

## 2021-07-09 PROCEDURE — 85025 COMPLETE CBC W/AUTO DIFF WBC: CPT

## 2021-07-09 PROCEDURE — 74011000250 HC RX REV CODE- 250: Performed by: PHYSICIAN ASSISTANT

## 2021-07-09 RX ORDER — SODIUM CHLORIDE 0.9 % (FLUSH) 0.9 %
5-10 SYRINGE (ML) INJECTION AS NEEDED
Status: DISCONTINUED | OUTPATIENT
Start: 2021-07-09 | End: 2021-07-09 | Stop reason: HOSPADM

## 2021-07-09 RX ADMIN — SODIUM CHLORIDE 121 ML: 900 INJECTION, SOLUTION INTRAVENOUS at 17:00

## 2021-07-09 RX ADMIN — CEFEPIME HYDROCHLORIDE 2 G: 2 INJECTION, POWDER, FOR SOLUTION INTRAVENOUS at 14:55

## 2021-07-09 RX ADMIN — SODIUM CHLORIDE 1000 ML: 900 INJECTION, SOLUTION INTRAVENOUS at 14:54

## 2021-07-09 NOTE — ED NOTES
Unable to complete documentation of completed IV fluids due to lock of fluids in chart. Unable to find computer with lock.

## 2021-07-09 NOTE — ED NOTES
Client maintained o2 sat of 95% on ambulation. NAD. Able to speak in well formed sentences post ambulation.

## 2021-07-09 NOTE — ED PROVIDER NOTES
EMERGENCY DEPARTMENT HISTORY AND PHYSICAL EXAM  This was created with voice recognition software and transcription errors may be present. 4:09 PM  Date: 7/9/2021  Patient Name: Gloria Jama    History of Presenting Illness     Chief Complaint:    History Provided By:     HPI: Gloria Jama is a 45 y.o. male medical history of stroke hypertension who presents with fever chills body aches and myalgias is been going on for 2 days. 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 No nausea no vomiting no diarrhea. No other aggravating alleviating factors no associated symptoms    PCP: None      Past History     Past Medical History:  No past medical history on file. Past Surgical History:  No past surgical history on file. Family History:  Family History   Problem Relation Age of Onset   [de-identified] Stroke Mother     Hypertension Mother     No Known Problems Father        Social History:  Social History     Tobacco Use    Smoking status: Light Tobacco Smoker    Smokeless tobacco: Never Used   Substance Use Topics    Alcohol use: Yes    Drug use: No       Allergies:  No Known Allergies    Review of Systems     Review of Systems   All other systems reviewed and are negative. 10 point review of systems otherwise negative unless noted in HPI. Physical Exam       Physical Exam  Constitutional:       Appearance: He is well-developed. HENT:      Head: Normocephalic and atraumatic. Eyes:      Pupils: Pupils are equal, round, and reactive to light. Cardiovascular:      Rate and Rhythm: Normal rate and regular rhythm. Heart sounds: Normal heart sounds. No murmur heard. No friction rub. Pulmonary:      Effort: Pulmonary effort is normal. No respiratory distress. Breath sounds: Normal breath sounds. No wheezing. Abdominal:      General: There is no distension. Palpations: Abdomen is soft.       Tenderness: There is no abdominal tenderness. There is no guarding or rebound. Musculoskeletal:         General: Normal range of motion. Cervical back: Normal range of motion and neck supple. Skin:     General: Skin is warm and dry. Neurological:      Mental Status: He is alert and oriented to person, place, and time. Psychiatric:         Behavior: Behavior normal.         Thought Content: Thought content normal.         Diagnostic Study Results     Vital Signs  EKG: EKG shows sinus at 95 with a normal axis normal intervals there is no ST elevation or depression no hypertrophy  Labs: Labs unremarkable with covid test positive  Imaging:     Medical Decision Making     ED Course: Progress Notes, Reevaluation, and Consults:    I will be the provider of record for this patient. Provider Notes (Medical Decision Making): Patient with fever chills myalgias Covid test positive oxygen saturation normal but slightly low at 93% will ambulate to see if we need to admit       Dust x-rays negative. Patient ambulated with sats of 96% will discharge with outpatient follow-up    Return precautions with the patient all questions answered    Diagnosis     Clinical Impression: No diagnosis found. Disposition:        Patient's Medications   Start Taking    No medications on file   Continue Taking    BUPROPION XL (WELLBUTRIN XL) 150 MG TABLET    Take 1 Tab by mouth every morning. MELOXICAM (MOBIC) 7.5 MG TABLET    Take 1 Tab by mouth daily.    These Medications have changed    No medications on file   Stop Taking    No medications on file

## 2021-07-09 NOTE — ED TRIAGE NOTES
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 Today he feels like he has the flu.

## 2021-07-09 NOTE — Clinical Note
[address] [place] [address] Dr [facility] EMERGENCY DEPT  [id number] [address] [phone]    Work/School Note    Date: 7/9/2021     To Whom It May concern:    Marzena Bejarano was evaulated by the following provider(s):  Attending Provider: Jeni Kaplan [address] [place] [address] virus is suspected. Per the CDC guidelines we recommend home isolation until the following conditions are all met:    1. At least 10 days have passed since symptoms first appeared and  2. At least 24 hours have passed since last fever without the use of fever-reducing medications and  3.  Symptoms (e.g., cough, shortness of breath) have improved    Sincerely,          Misael Saini MD

## 2021-07-09 NOTE — Clinical Note
[address] [place] [address] Dr [facility] EMERGENCY DEPT  [phone] [address] [phone]    Work/School Note    Date: 7/9/2021    To Whom It May concern:      Marzena Bejarano was seen and treated today in the emergency room by the following provider(s):  Attending Provider: Jeni Kaplan MD.      Marzena Bejarano is excused from work/school on 07/09/21. He is clear to return to work/school on 07/10/21.         Sincerely,          Misael Saini MD

## 2021-07-09 NOTE — ED NOTES
I performed a brief history of the patient here in triage and I have determined that pt will need further treatment and evaluation from the main side ER physician or [facility]. I have placed initial orders based on the history to help in expediting patients care. Sepsis bundle initiated.       Sharyle Blase, PA 2:29 PM

## 2021-07-10 ENCOUNTER — PATIENT OUTREACH (OUTPATIENT)
Dept: CASE MANAGEMENT | Age: 51
End: 2021-07-10

## 2021-07-10 LAB
ATRIAL RATE: 95 BPM
CALCULATED P AXIS, ECG09: 74 DEGREES
CALCULATED R AXIS, ECG10: 75 DEGREES
CALCULATED T AXIS, ECG11: 48 DEGREES
DIAGNOSIS, 93000: NORMAL
P-R INTERVAL, ECG05: 138 MS
Q-T INTERVAL, ECG07: 356 MS
QRS DURATION, ECG06: 72 MS
QTC CALCULATION (BEZET), ECG08: 447 MS
VENTRICULAR RATE, ECG03: 95 BPM

## 2021-07-10 NOTE — PROGRESS NOTES
Patient contacted regarding COVID-19 diagnosis. Discussed COVID-19 related testing which was available at this time. Test results were positive. Patient informed of results, if available? yes. Outreach made within 2 business days of discharge: [place] [address] Coordinator contacted the patient by telephone to perform post discharge assessment. Verified name and  with patient as identifiers. Provided introduction to self, and explanation of LPN Care Coordinator role, and reason for call due to risk factors for infection and/or exposure to COVID-19. Symptoms reviewed with patient who verbalized the following symptoms: cough, no new symptoms, no worsening symptoms and nasal congestion . Denies SOB, chest pain, chills, fever, body aches, wheezing, lightheadness/dizziness,HA, n/v/d at this time. Due to no new or worsening symptoms encounter was not routed to provider for escalation. Discussed follow-up appointments. If no appointment was previously scheduled, appointment scheduling offered: yes  [facility] follow up appointment(s): No future appointments. Non-[facility] follow up appointment(s): n/a    Patient encouraged to make an appointment with PCP for f/up. Advised to return to ED if symptoms worsen or fail to improve. Patients acknowledges understanding. Advance Care Planning:   Does patient have an Advance Directive: currently not on file; education provided      Patient has following risk factors of:   COVID-19 Positive . LPN reviewed discharge instructions, medical action plan and red flags such as increased shortness of breath, increasing fever and signs of decompensation with patient who verbalized understanding. Discussed exposure protocols and quarantine with CDC Guidelines What to do if you are sick with coronavirus disease .  Patient was given an opportunity for questions and concerns.  The patient agrees to contact the [facility] exposure line [phone], [facility] Department of Health  ([phone]} and PCP office for questions related to their healthcare. LPN provided contact information for future needs. Reviewed and educated patient on any new and changed medications related to discharge diagnosis. Plan for follow-up call in 7-14 days based on severity of symptoms and risk factors.

## 2021-07-15 LAB
BACTERIA SPEC CULT: NORMAL
BACTERIA SPEC CULT: NORMAL
SERVICE CMNT-IMP: NORMAL
SERVICE CMNT-IMP: NORMAL

## 2021-07-17 ENCOUNTER — PATIENT OUTREACH (OUTPATIENT)
Dept: CASE MANAGEMENT | Age: 51
End: 2021-07-17

## 2021-08-26 ENCOUNTER — HOSPITAL ENCOUNTER (OUTPATIENT)
Dept: PHYSICAL THERAPY | Age: 51
Discharge: HOME | End: 2021-08-26
Payer: MEDICAID

## 2021-08-26 PROCEDURE — 97165 OT EVAL LOW COMPLEX 30 MIN: CPT

## 2021-08-26 NOTE — PROGRESS NOTES
OT DAILY TREATMENT NOTE     Patient Name: Gloria Jama  Date:2021  : 1970  [x]  Patient  Verified  Payor: BLUE CROSS MEDICAID / Plan: [facility] Kelsea Mota / Product Type: Managed Care Medicaid /    In time:300  Out time:340  Total Treatment Time (min): 40  Visit #: 1 of 12    Medicare/BCBS Only   Total Timed Codes (min):  0 1:1 Treatment Time:  40     Treatment Area: Hand weakness [R29.898]    SUBJECTIVE  Pain Level (0-10 scale): 6/10  Any medication changes, allergies to medications, adverse drug reactions, diagnosis change, or new procedure performed?: [x] No    [] Yes (see summary sheet for update)  Subjective functional status/changes:   [] No changes reported  My hands hurt so much I can't hold things    OBJECTIVE        40 min [x]Eval                  []Re-Eval             With   [] TE   [] TA   [] neuro   [] other: Patient Education: [x] Review HEP    [x] Progressed/Changed HEP based o  n: OT POC  [] positioning   [] body mechanics   [] transfers   [] heat/ice application   [] Splint wear/care   [] Sensory re-education   [] scar management      [] other:            Other Objective/Functional Measures:   Subjective: pt is a right hand dominant, 45 y.o.y/o, male who has had 3 month history of pain and stiffness worse at night, in cold in right hand more than left. Prior level of function: Not working at present due ot Ad[employer], Worked [employer], Watch sports, basketball, I self care home care does not drive, [place]  Pain BEDJP:(9-OJ pain 10-debilitating pain) severe    Description/Location: both hand with c/o no relief   Worst pain8/10 Least pain 6/10   Activities which aggravate pain: worse at night and cold   Activities which ease pain: warm water  Current functional limitations/living situation: alone, can't cut, lifting    Medical hx: arthritis in knee    Medications: See the written copy of this report in the patient's paper medical record.        Objective:  Growth present on index digit tip with concave interior     Hand ROM Left   Index Middle Ring Small Thumb    MP 90 90 85 90  CMC    105 100 110 50 MP   DIP 50 60 60 60 30 IP         Bauer Abd         Radial Abd       Hand A/PROM right Index Middle Ring Small Thumb    MP 0-65 0-80 0-70 0-50  CMC   PIP     0-30 MP   DIP 0-50 0-60 +15-60 +15-65 0-35 IP         Bauer Abd         Radial Abd     Hand Strength:   Gross Grasp 3pt Pinch Lateral Pinch Tip Pinch   Right  35 6 6 4   Left 68 16 16 12     Nine-Hole Peg Test:  Left= _17____seconds  Right=__24___seconds  Finger Opposition:           ADLs  Feeding:        []MaxA   []ModA   [x]Sina   [] CGA   []SBA   []Orion   []Independent  UE Dressing:       []MaxA   []ModA   []Sina   [] CGA   []SBA   []Orion   [x]Independent  LE Dressing:       []MaxA   []ModA   []Sina   [] CGA   []SBA   []Orion   [x]Independent  Grooming:       []MaxA   []ModA   []Sina   [] CGA   []SBA   []Orion   [x]Independent  Toileting:       []MaxA   []ModA   []Sina   [] CGA   []SBA   []Orion   [x]Independent  Bathing:       []MaxA   []ModA   [x]Sina   [] CGA   []SBA   []Orion   []Independent  Light Meal Prep:    []MaxA   [x]ModA   []Sina   [] CGA   []SBA   []Orion   []Independent  Household/Other: []MaxA   []ModA   [x]Sina   [] CGA   []SBA   []Orion   []Independent  Adaptive Equip:     []MaxA   []ModA   []Sina   [] CGA   []SBA   []Orion   []Independent  Driving:       []MaxA   []ModA   []Sina   [] CGA   []SBA   []Orion   []IndependentN/A  Money Mgmt:        []MaxA   []ModA   []Sina   [] CGA   []SBA   []Orion   [x]Independent       Pain Level (0-10 scale) post treatment: 6/10    ASSESSMENT/Changes in Function:    [x]  See Plan of Care  []  See progress note/recertification  []  See Discharge Summary             PLAN  []  Upgrade activities as tolerated     []  Continue plan of care  []  Update interventions per flow sheet       []  Discharge due to:_  []  Other:_      Gerda Su, OTR/L 8/26/2021  2:31 PM    Future Appointments   Date Time Provider Nannette Gutierrez   8/26/2021  3:00 PM Pj Caller, OTR/L [facility]

## 2021-08-26 NOTE — PROGRESS NOTES
[facility] [employer] [facility]  [address] [facility]  [phone] fax    Plan of Care/Statement of Necessity for Occupational Therapy Services    Patient name: Qing Baker Start of Care: 2021   Referral source: Raynelle Lesch, * : 1970    Medical Diagnosis: Hand weakness [R29.898]  Payor: BLUE CROSS MEDICAID / Plan: [facility] [employer] PLUS / Product Type: Managed Care Medicaid /  Onset Date:3 months    Treatment Diagnosis: Pain both hands   Prior Hospitalization: see medical history Provider#: [id number]   Medications: Verified on Patient summary List    Comorbidities: Arthritis   Prior Level of Function: Not working at present, worked [employer] prior to [employer] self care, home care          The Plan of Care and following information is based on the information from the initial evaluation. Assessment/ key information: Patient is 45year old right hand dominant male who has had recent history of pain and stiffness in both hands right worse than left without injury. He reports pain began 3 months ago and is worst when cold or in am.  He has arthritic changes with flexion at PIPs of all digits and hyperextension of DIPs on several digits ( Boutonniere) on his right hand. His  on right is decreased to 35# with left at 68#. Pinches on right are 4-6# ( left are 12-16#). Kendrick motor speed has deteriorated to 24 seconds on right. He reports difficulty cutting food, lifting carrying, and performing household activities. His FOTO is 42/100 reflecting severe impairment in function.   He will benefit from skilled occupational therapyoot improve bilateral hand use     Evaluation Complexity: History LOW Complexity : Brief history review  Examination LOW Complexity : 1-3 performance deficits relating to physical, cognitive , or psychosocial skils that result in activity limitations and / or participation restrictions  Clinical Decision Making LOW Complexity : No comorbidities that affect functional and no verbal or physical assistance needed to complete eval tasks   Overall Complexity Rating: LOW     Problem List: Pain effecting function, Decreased range of motion, Decreased strength, Decreased coordination/prehension, Decreased ADL/functional abilities  and Decreased activity tolerance     Treatment Plan may include any combination of the following: Therapeutic exercise, Therapeutic activities, Physical agent/modality, Patient education and ADLs/IADLs    Patient / Family readiness to learn indicated by: asking questions, trying to perform skills and interest    Persons(s) to be included in education:   patient (P)    Barriers to Learning/Limitations: None    Patient Goal (s): Pain go away    Patient Self Reported Health Status: good    Rehabilitation Potential: good    Short Term Goals: To be accomplished in 2 weeks:  1. Patient will report pain in right hand reduced to 4/10 with use of modalities in clinic. 2.  Patient will be independent in [facility] for digit ROM. 3.  Patient will be independent in HEP for hand strengthening. 4.  Patient will be introduced to simple adaptive equipment to aid cutting meat    Long Term Goals: To be accomplished in 4 weeks:   1. Patient patt report pain diminished to 0-2/10 with routine use  2. Patient will improve ability to close right hand tightly as shown by increase in  of at least 15#.  3.  Patient will report  improved performance of home care as shown by increase in FOTO of at least 15  points.       Frequency / Duration: Patient to be seen 2-3 times per week for 4 weeks:    Patient/ Caregiver education and instruction: Diagnosis, prognosis, self care, activity modification and exercises   [x]  Plan of care has been reviewed with BELL Delatorre 8/26/2021 3:43 PM    _____________________________________________________________________    I certify that the above Therapy Services are being furnished while the patient is under my care. I agree with the treatment plan and certify that this therapy is necessary.     [de-identified] Signature:____________Date:_________TIME:________     Ahmet Fernandez, *  ** Signature, Date and Time must be completed for valid certification **    Please sign and return to [facility] [facility]   [address] [place] [address]  [phone] fax

## 2021-12-07 NOTE — PROGRESS NOTES
[facility] - [facility]  [address] [facility]  [phone] fax    Occupational Therapy Discharge Summary    Patient name: Kasandra Bradley Start of Care: 21   Referral source: Alea Cormier, * : 1970   Medical/Treatment Diagnosis: Pain in right hand [M79.641]  Pain in left hand [M79.642]  Payor: BLUE CROSS MEDICAID / Plan: [address] [place] [address] / Product Type: Managed Care Medicaid /  Onset Date:3 months     Prior Hospitalization: see medical history Provider#: [id number]   Medications: Verified on Patient Summary List    Comorbidities: Arthritis  Prior Level of Function:Not working at present, worked [employer] prior to [employer] self care, home care              Visits from [place] of Care: 1    Missed Visits: 1  Reporting Period : 21 to 21    Summary of Care:Patient seen for eval only. No showed for follow up visit    1. Patient will report pain in right hand reduced to 4/10 with use of modalities in clinic. Status at last note/certification:Pain   Status at discharge: not met    2. Patient will be independent in HEP for digit ROM. Status at last note/certification:did not have  Status at discharge: not met    3. Patient will be independent in HEP for hand strengthening. Status at last note/certification:did not have  Status at discharge: not met    4. Patient will be introduced to simple adaptive equipment to aid cutting meat  Status at last note/certification:unaware  Status at discharge: not met       Long Term Goals: To be accomplished in 4 weeks:          1. Patient patt report pain diminished to 0-2/10 with routine use  Status at last note/certification:pain   Status at discharge: not met    2. Patient will improve ability to close right hand tightly as shown by increase in  of at least 15#.   Status at last note/certification:Unable to close hand tightly  Status at discharge: not met Not reassessed due to unplanned discharge    3. Patient will report  improved performance of home care as shown by increase in FOTO of at least 15  points. Status at last note/certification:FOTO 42  Status at discharge: not met Not reassessed due to unplanned discharge    ASSESSMENT/Changes in Function: Patient seen for eval only    ASSESSMENT/RECOMMENDATIONS:  [x]Discontinue therapy: []Patient has reached or is progressing toward set goals      [x]Patient is non-compliant or has abdicated      []Due to lack of appreciable progress towards set goals    Meek Mccrary OTR/L 12/7/2021 11:30 AM    NOTE TO PHYSICIAN:  Please complete the following and fax to: [facility] at [facility] at [phone]  . Retain this original for your records. If you are unable to process this request in   24 hours, please contact our office.      [] I have read the above report and request that my patient continue therapy with the following changes/special instructions:  [] I have read the above report and request that my patient be discharged from therapy    Physician's Signature:____________Date:_________TIME:________     Nigel Fernandez, *  ** Signature, Date and Time must be completed for valid certification **

## 2022-09-20 PROBLEM — F32.1 MODERATE MAJOR DEPRESSION (HCC): Status: ACTIVE | Noted: 2022-09-20

## 2022-09-21 ENCOUNTER — OFFICE VISIT (OUTPATIENT)
Dept: FAMILY MEDICINE CLINIC | Age: 52
End: 2022-09-21

## 2022-09-27 NOTE — PROGRESS NOTES
Mandi Lan is a 47 y.o. male is seen on 9/28/2022 for Establish Care (Patient states that he need surgical clearance for foot surgery with Dr. Whiteside Goes out in [place]. Patient states that he needs colonoscopy screening, referral for memory issues, and  labs. ) and Side Pain (Patient states that he been having side pain on left and right pain. He states that pain been on going for three consecutive weeks. He states that pain come and go. )    Assessment & Plan:     1. Essential hypertension  Assessment & Plan:  BP elevated, goal <130/80  Start amlodipine 5 mg and re-evaluate in 4 weeks  Titrate amlodipine if BP goal not achieved at recheck  Complete fasting labs  Orders:  -     amLODIPine (NORVASC) 5 mg tablet; Take 1 Tablet by mouth daily. Indications: high blood pressure, Normal, Disp-90 Tablet, R-0  -     LIPID PANEL; Future  -     METABOLIC PANEL, COMPREHENSIVE; Future  -     CBC WITH AUTOMATED DIFF; Future  -     TSH 3RD GENERATION; Future  2. Chronic hand pain, right  Assessment & Plan:  Worsening, consult hand specialist  Orders:  -     REFERRAL TO HAND SURGERY  3. Acute pain of right hip  -     REFERRAL TO ORTHOPEDICS  4. Moderate major depression (HCC)  Assessment & Plan:  Recurrent, consider restarting Wellbutrin once labs completed  5. Sebaceous cyst  Comments:  Consider derm consult at next office visit  6. SUSANNA on CPAP  Assessment & Plan:  Newly-diagnosed, continue plans for CPAP  7. Encounter for completion of form with patient  Comments:  Physical ability portion of disability form to be completed by ortho  Will complete mental capacity assessment portion of form once labs completed  8. Screen for colon cancer  -     REFERRAL TO GASTROENTEROLOGY  9. Encounter to establish care    Follow-up and Dispositions    Return for blood pressure, cyst, depression, form completion, lab results- 30 MINUTES.        Subjective:     HPI    Previous PCP: None  Reason for switching: n/a    Social Hx:  Occupation- unemployed  Household- lives with daughter  Alcohol Use- 2 beers a day every other day  Recreational Drug Use- marijuana  Tobacco Use- current smoker    Nicotine Dependence-  Years of smoking: since he was 23 yo  Amount:  1 ppd  Previous attempt to quit: no  Past medications used for cessation: none  Current symptoms: none    Family Hx:  HTN- mother, MGM  Diabetes- mother, MGM  HLD- mother, MGM  MI- MGM  Stroke- MGM, mother  Cancer- MGF (throat)  Mental Health Disorder- unsure  Autoimmune Disease - unsure    Preventive:  COVID-19 vac - received 2 doses  Flu vaccine- recommended  Tetanus vac - recommended  Pneumonia vaccine- recommended  Shingles vaccine- recommended  Colonoscopy- referred  MyChart activation - sent via email    Medical History/Health Concerns:    Has not been to a doctor in a couple of years    Hand Pain -  Onset: chronic  Location: right hand  Duration: constant  Characteristics: sharp pains  Associated symptoms: deformity of 4th digit, swelling, occasional numbness  Aggravating factors: unable to properly  objects  Relieving factors: none  Treatment: has been running some warm water  Works with a jackhammer for 13 years  Pain ratin/10    Hip Pain -  Onset: 1 month  Location: bilateral  Duration: intermittent  Characteristics: sharp pains  Associated symptoms: numbness  Aggravating factors: turning   Relieving factors: none  Treatment: none  Pain ratin/10    Skin Lesion -  Onset:  2-3 years  Has not increased in size  Was an open wound at one point but has healed over    Depression-  Patient is seen for followup of depression.    Ongoing symptoms include:  see PHQ-9  Current treatment: None  Has never been on medication before  Has not been able to work due to his pain  Has an 24 yo daughter to take care of  3 most recent [address] [place] [address],[address] 2022   Little interest or pleasure in doing things Not at all Not at all Not at all   Feeling down, depressed, irritable, or hopeless More than half the days Not at all Nearly every day   Total Score PHQ 2 2 0 3   Trouble falling or staying asleep, or sleeping too much Not at all - Not at all   Feeling tired or having little energy Nearly every day - Nearly every day   Poor appetite, weight loss, or overeating Several days - Not at all   Feeling bad about yourself - or that you are a failure or have let yourself or your family down More than half the days - Several days   Trouble concentrating on things such as school, work, reading, or watching TV More than half the days - Nearly every day   Moving or speaking so slowly that other people could have noticed; or the opposite being so fidgety that others notice More than half the days - Not at all   Thoughts of being better off dead, or hurting yourself in some way Not at all - Not at all   PHQ 9 Score 12 - 10   How difficult have these problems made it for you to do your work, take care of your home and get along with others Extremely difficult - Very difficult     Elevated BP -  Has not had issues with this before  Reports family hx of HTN  Denies any chest pains    SUSANNA -  Recently diagnosed  Received paperwork for his CPAP  Risk factors:  HTN    Review of Systems   Constitutional:  Negative for chills, fever and malaise/fatigue. Respiratory:  Negative for shortness of breath. Cardiovascular:  Negative for chest pain. Musculoskeletal:  Positive for joint pain. Skin:         Reports cyst of right side of abdomen   Psychiatric/Behavioral:  Positive for depression. Negative for suicidal ideas. Objective:   BP (!) 153/96 (BP 1 Location: Left upper arm, BP Patient Position: Sitting, BP Cuff Size: Adult)   Pulse 85   Temp 98.3 °F (36.8 °C) (Oral)   Resp 16   Ht 5' 9\" (1.753 m)   Wt 178 lb (80.7 kg)   SpO2 98%   BMI 26.29 kg/m²     Physical Exam  Vitals and nursing note reviewed. Constitutional:       Appearance: Normal appearance.    HENT:      Head: Normocephalic and atraumatic. Cardiovascular:      Rate and Rhythm: Normal rate and regular rhythm. Heart sounds: No murmur heard. No gallop. Pulmonary:      Effort: Pulmonary effort is normal. No respiratory distress. Breath sounds: No wheezing, rhonchi or rales. Musculoskeletal:      Right hand: Swelling, deformity and tenderness present. Decreased strength. Normal capillary refill. Normal pulse. Left hand: Swelling, deformity and tenderness present. Decreased strength. Normal capillary refill. Normal pulse. Right hip: No deformity. Decreased range of motion (pain with internal/external rotation). Normal strength. Left hip: No deformity. Normal range of motion. Skin:     General: Skin is warm and dry. Neurological:      General: No focal deficit present. Mental Status: He is alert and oriented to person, place, and time. Psychiatric:         Mood and Affect: Mood normal.         Thought Content:  Thought content normal.         Judgment: Judgment normal.        Total time spent:  60 minutes  Charly Bowser NP

## 2022-09-28 ENCOUNTER — OFFICE VISIT (OUTPATIENT)
Dept: FAMILY MEDICINE CLINIC | Age: 52
End: 2022-09-28
Payer: MEDICAID

## 2022-09-28 VITALS
TEMPERATURE: 98.3 F | BODY MASS INDEX: 26.36 KG/M2 | SYSTOLIC BLOOD PRESSURE: 153 MMHG | DIASTOLIC BLOOD PRESSURE: 96 MMHG | WEIGHT: 178 LBS | HEIGHT: 69 IN | RESPIRATION RATE: 16 BRPM | OXYGEN SATURATION: 98 % | HEART RATE: 85 BPM

## 2022-09-28 DIAGNOSIS — Z12.11 SCREEN FOR COLON CANCER: ICD-10-CM

## 2022-09-28 DIAGNOSIS — G47.33 OSA ON CPAP: ICD-10-CM

## 2022-09-28 DIAGNOSIS — Z02.89 ENCOUNTER FOR COMPLETION OF FORM WITH PATIENT: ICD-10-CM

## 2022-09-28 DIAGNOSIS — Z76.89 ENCOUNTER TO ESTABLISH CARE: ICD-10-CM

## 2022-09-28 DIAGNOSIS — Z99.89 OSA ON CPAP: ICD-10-CM

## 2022-09-28 DIAGNOSIS — L72.3 SEBACEOUS CYST: ICD-10-CM

## 2022-09-28 DIAGNOSIS — M79.641 CHRONIC HAND PAIN, RIGHT: ICD-10-CM

## 2022-09-28 DIAGNOSIS — M25.551 ACUTE PAIN OF RIGHT HIP: ICD-10-CM

## 2022-09-28 DIAGNOSIS — F32.1 MODERATE MAJOR DEPRESSION (HCC): ICD-10-CM

## 2022-09-28 DIAGNOSIS — I10 ESSENTIAL HYPERTENSION: Primary | ICD-10-CM

## 2022-09-28 DIAGNOSIS — G89.29 CHRONIC HAND PAIN, RIGHT: ICD-10-CM

## 2022-09-28 PROCEDURE — 99205 OFFICE O/P NEW HI 60 MIN: CPT | Performed by: NURSE PRACTITIONER

## 2022-09-28 RX ORDER — AMLODIPINE BESYLATE 5 MG/1
5 TABLET ORAL DAILY
Qty: 90 TABLET | Refills: 0 | Status: SHIPPED | OUTPATIENT
Start: 2022-09-28

## 2022-09-28 NOTE — PROGRESS NOTES
Atul Kat presents today for   Chief Complaint   Patient presents with    [facility] Care     Patient states that he need surgical clearance for foot surgery with Dr. Vero Manning out in [place]. Patient states that he needs colonoscopy screening, referral for memory issues, and  labs. Side Pain     Patient states that he been having side pain on left and right pain. He states that pain been on going for three consecutive weeks. He states that pain come and go.         Is someone accompanying this pt? no    Is the patient using any DME equipment during [address]? no    Depression Screening:  3 most recent PHQ Screens 9/28/2022   Little interest or pleasure in doing things Not at all   Feeling down, depressed, irritable, or hopeless More than half the days   Total Score PHQ 2 2   Trouble falling or staying asleep, or sleeping too much Not at all   Feeling tired or having little energy Nearly every day   Poor appetite, weight loss, or overeating Several days   Feeling bad about yourself - or that you are a failure or have let yourself or your family down More than half the days   Trouble concentrating on things such as school, work, reading, or watching TV More than half the days   Moving or speaking so slowly that other people could have noticed; or the opposite being so fidgety that others notice More than half the days   Thoughts of being better off dead, or hurting yourself in some way Not at all   PHQ 9 Score 12   How difficult have these problems made it for you to do your work, take care of your home and get along with others Extremely difficult       Learning Assessment:  Learning Assessment 4/29/2019   PRIMARY LEARNER Patient   HIGHEST LEVEL OF EDUCATION - PRIMARY LEARNER  GRADUATED HIGH SCHOOL OR GED   BARRIERS PRIMARY LEARNER NONE   CO-LEARNER CAREGIVER -   PRIMARY LANGUAGE ENGLISH   LEARNER PREFERENCE PRIMARY VIDEOS     PICTURES   ANSWERED BY self   RELATIONSHIP SELF       Fall Risk  No flowsheet data found.    ADL  No flowsheet data found. Travel Screening:    Travel Screening       Question Response    In the last 10 days, have you been in contact with someone who was confirmed or suspected to have Coronavirus/COVID-19? No / Unsure    Have you had a COVID-19 viral test in the last 10 days? No    Do you have any of the following new or worsening symptoms? None of these    Have you traveled internationally or domestically in the last month? No          Travel History   Travel since 08/28/22    No documented travel since 08/28/22         Health Maintenance reviewed and discussed and ordered per Provider. Health Maintenance Due   Topic Date Due    Hepatitis C Screening  Never done    COVID-19 Vaccine (1) Never done    Pneumococcal 0-64 years (1 - PCV) Never done    DTaP/Tdap/Td series (1 - Tdap) Never done    Lipid Screen  Never done    Colorectal Cancer Screening Combo  Never done    Shingrix Vaccine Age 50> (1 of 2) Never done    Low dose CT lung screening  Never done    Flu Vaccine (1) Never done   . Coordination of Care:  1. Have you been to the ER, urgent care clinic since your last visit? Hospitalized since your last visit? no    2. Have you seen or consulted any other health care providers outside of the [address] [place] [address] since your last visit? Include any pap smears or colon screening.  no

## 2022-09-28 NOTE — PATIENT INSTRUCTIONS
SUMMARY:      a blood pressure machine that goes around the arm. Take your blood pressures once a day before breakfast.  Sit in a chair, feet flat on the floor, back supported. Place the cuff around your arm then sit for 1-2 minutes before you hit the start button.

## 2022-09-28 NOTE — ASSESSMENT & PLAN NOTE
BP elevated, goal <130/80  Start amlodipine 5 mg and re-evaluate in 4 weeks  Titrate amlodipine if BP goal not achieved at recheck  Complete fasting labs

## 2022-10-12 ENCOUNTER — OFFICE VISIT (OUTPATIENT)
Dept: ORTHOPEDIC SURGERY | Age: 52
End: 2022-10-12
Payer: MEDICAID

## 2022-10-12 VITALS
WEIGHT: 178 LBS | BODY MASS INDEX: 26.36 KG/M2 | OXYGEN SATURATION: 100 % | HEIGHT: 69 IN | TEMPERATURE: 97.5 F | HEART RATE: 90 BPM

## 2022-10-12 DIAGNOSIS — M25.551 HIP PAIN, RIGHT: Primary | ICD-10-CM

## 2022-10-12 DIAGNOSIS — M25.859 FEMOROACETABULAR IMPINGEMENT: ICD-10-CM

## 2022-10-12 DIAGNOSIS — M16.11 PRIMARY OSTEOARTHRITIS OF RIGHT HIP: ICD-10-CM

## 2022-10-12 PROCEDURE — 73521 X-RAY EXAM HIPS BI 2 VIEWS: CPT | Performed by: SPECIALIST

## 2022-10-12 PROCEDURE — 99203 OFFICE O/P NEW LOW 30 MIN: CPT | Performed by: SPECIALIST

## 2022-10-12 NOTE — PROGRESS NOTES
Patient: Sofia Sewell                MRN: [id number]       SSN: [id number]  YOB: 1970        AGE: 47 y.o. SEX: male    PCP: None  10/12/22    CC: RIGHT HIP PAIN    HISTORY:  Sofia Sewell is a 47 y.o. male who is seen for right hip pain. He has been experiencing right hip pain for the past several months. He does not recall any injury. He feels pain in his  groin and  lateral hip with standing and walking. His hip  stiffens up after he sits for long periods of time. He states that he can just be standing and out of nowhere he feels an extremely sharp pain. He reports having back pain as well. He is having surgery on his foot with Dr. Igor Caceres to remove calloses. Pain Assessment  10/12/2022   Location of Pain Hip   Location Modifiers Right   Severity of Pain 6   Quality of Pain Sharp; Aching   Duration of Pain Persistent   Frequency of Pain Constant   Aggravating Factors Standing;Walking;Bending   Relieving Factors Nothing     Occupation, etc:  Mr. Rayne Maxwell is currently out of work. He trying to get social security benefits for sleep apnea. 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 He lives in [place] with his mom, daughter, brother, and sister. Mr. Rayne Maxwell weighs 178 lbs and is 5'9\" tall. He is needle phobic.        No results found for: HBA1C, KPS6DMUM, QWC4ZEKV, XQB3LPRU  Weight Metrics 10/12/2022 9/28/2022 7/9/2021 4/29/2019 1/3/2019 1/22/2018 3/4/2016   Weight 178 lb 178 lb 180 lb 185 lb 3.2 oz 178 lb 172 lb 184 lb   BMI 26.29 kg/m2 26.29 kg/m2 26.58 kg/m2 27.35 kg/m2 26.29 kg/m2 25.4 kg/m2 27.16 kg/m2       Patient Active Problem List   Diagnosis Code    Moderate major depression ([facility] [address] F32.1    Essential hypertension I10    Chronic hand pain, right M79.641, G89.29    SUSANNA on CPAP G47.33, Z99.89     REVIEW OF SYSTEMS:    Constitutional Symptoms: Negative   Eyes: Negative   Ears, Nose, Throat and Mouth: Negative   Cardiovascular: Negative   Respiratory: Negative   Genitourinary: Per HPI   Gastrointestinal: Per HPI   Integumentary (Skin and/or Breast): Negative   Musculoskeletal: Per HPI   Endocrine/Rheumatologic: Negative   Neurological: Per HPI   Hematology/Lymphatic: Negative    Allergic/Immunologic: Negative   Phychiatric: Negative    Social History     Socioeconomic History    Marital status: SINGLE     Spouse name: Not on file    Number of children: Not on file    Years of education: Not on file    Highest education level: Not on file   Occupational History    Not on file   Tobacco Use    Smoking status: Every Day     Packs/day: 1.00     Years: 35.00     Pack years: 35.00     Types: Cigarettes    Smokeless tobacco: Never   Vaping Use    Vaping Use: Never used   Substance and Sexual Activity    Alcohol use: Yes     Alcohol/week: 4.0 standard drinks     Types: 2 Cans of beer, 2 Standard drinks or equivalent per week    Drug use: No    Sexual activity: Not Currently   Other Topics Concern    Not on file   Social History Narrative    Not on file     Social Determinants of Health     Financial Resource Strain: Not on file   Food Insecurity: Not on file   Transportation Needs: Not on file   Physical Activity: Not on file   Stress: Not on file   Social Connections: Not on file   Intimate Partner Violence: Not on file   Housing Stability: Not on file      No Known Allergies   Current Outpatient Medications   Medication Sig    amLODIPine (NORVASC) 5 mg tablet Take 1 Tablet by mouth daily. Indications: high blood pressure     No current facility-administered medications for this visit.       PHYSICAL EXAMINATION:  Visit Vitals  Pulse 90   Temp 97.5 °F (36.4 °C) (Temporal)   Ht 5' 9\" (1.753 m)   Wt 178 lb (80.7 kg)   SpO2 100%   BMI 26.29 kg/m²      ORTHO EXAMINATION:  Examination Right hip Left hip   Skin Intact Intact   External Rotation ROM 10 20   Internal Rotation ROM 10 10   Trochanteric tenderness - -   Hip flexion contracture - -   Antalgic gait - -   Trendelenberg sign - -   Lumbar tenderness - -   Straight leg raise - -   Calf tenderness - -   Neurovascular Intact Intact     RADIOGRAPHS:  XR BILAT HIPS 10/12/22 JOHANNA  IMPRESSION:  AP pelvis and two views - No fractures, moderate joint space narrowing, + osteophytes present. Tonnis grade 2. DUANE      IMPRESSION:      ICD-10-CM ICD-9-CM    1. Hip pain, right  M25.551 719.45 AMB POC X-RAY HIPS, BILAT, 2+ VIEWS      REFERRAL TO PHYSICAL THERAPY      2. Primary osteoarthritis of right hip  M16.11 715.15 REFERRAL TO PHYSICAL THERAPY      3. Femoroacetabular impingement  M25.859 719.85 REFERRAL TO PHYSICAL THERAPY          PLAN:  He will start a brief course of outpatient physical therapy. Consider cortisone injection if pains continue. There is no need for surgery at this time. He will follow up as needed.       Scribed by Marsha Xiong ([address]) as dictated by Aroldo Vanessa MD

## 2022-10-13 ENCOUNTER — TELEPHONE (OUTPATIENT)
Dept: PHYSICAL THERAPY | Age: 52
End: 2022-10-13

## 2022-10-20 ENCOUNTER — HOSPITAL ENCOUNTER (OUTPATIENT)
Dept: LAB | Age: 52
Discharge: HOME | End: 2022-10-20

## 2022-10-20 LAB — XX-LABCORP SPECIMEN COL,LCBCF: NORMAL

## 2022-10-20 PROCEDURE — 99001 SPECIMEN HANDLING PT-LAB: CPT

## 2022-10-21 LAB
ALBUMIN SERPL-MCNC: 4.5 G/DL (ref 3.8–4.9)
ALBUMIN/GLOB SERPL: 2 {RATIO} (ref 1.2–2.2)
ALP SERPL-CCNC: 97 IU/L (ref 44–121)
ALT SERPL-CCNC: 12 IU/L (ref 0–44)
AST SERPL-CCNC: 18 IU/L (ref 0–40)
BASOPHILS # BLD AUTO: 0 X10E3/UL (ref 0–0.2)
BASOPHILS NFR BLD AUTO: 1 %
BILIRUB SERPL-MCNC: <0.2 MG/DL (ref 0–1.2)
BUN SERPL-MCNC: 13 MG/DL (ref 6–24)
BUN/CREAT SERPL: 15 (ref 9–20)
CALCIUM SERPL-MCNC: 9.3 MG/DL (ref 8.7–10.2)
CHLORIDE SERPL-SCNC: 104 MMOL/L (ref 96–106)
CHOLEST SERPL-MCNC: 210 MG/DL (ref 100–199)
CO2 SERPL-SCNC: 21 MMOL/L (ref 20–29)
CREAT SERPL-MCNC: 0.87 MG/DL (ref 0.76–1.27)
EGFR: 103 ML/MIN/1.73
EOSINOPHIL # BLD AUTO: 0.2 X10E3/UL (ref 0–0.4)
EOSINOPHIL NFR BLD AUTO: 5 %
ERYTHROCYTE [DISTWIDTH] IN BLOOD BY AUTOMATED COUNT: 13 % (ref 11.6–15.4)
GLOBULIN SER CALC-MCNC: 2.3 G/DL (ref 1.5–4.5)
GLUCOSE SERPL-MCNC: 97 MG/DL (ref 70–99)
HCT VFR BLD AUTO: 44.1 % (ref 37.5–51)
HDLC SERPL-MCNC: 35 MG/DL
HGB BLD-MCNC: 14.9 G/DL (ref 13–17.7)
IMM GRANULOCYTES # BLD AUTO: 0 X10E3/UL (ref 0–0.1)
IMM GRANULOCYTES NFR BLD AUTO: 0 %
IMP & REVIEW OF LAB RESULTS: NORMAL
LDLC SERPL CALC-MCNC: 132 MG/DL (ref 0–99)
LYMPHOCYTES # BLD AUTO: 1.2 X10E3/UL (ref 0.7–3.1)
LYMPHOCYTES NFR BLD AUTO: 28 %
MCH RBC QN AUTO: 31.2 PG (ref 26.6–33)
MCHC RBC AUTO-ENTMCNC: 33.8 G/DL (ref 31.5–35.7)
MCV RBC AUTO: 93 FL (ref 79–97)
MONOCYTES # BLD AUTO: 0.4 X10E3/UL (ref 0.1–0.9)
MONOCYTES NFR BLD AUTO: 9 %
NEUTROPHILS # BLD AUTO: 2.4 X10E3/UL (ref 1.4–7)
NEUTROPHILS NFR BLD AUTO: 57 %
PLATELET # BLD AUTO: 257 X10E3/UL (ref 150–450)
POTASSIUM SERPL-SCNC: 4.2 MMOL/L (ref 3.5–5.2)
PROT SERPL-MCNC: 6.8 G/DL (ref 6–8.5)
RBC # BLD AUTO: 4.77 X10E6/UL (ref 4.14–5.8)
SODIUM SERPL-SCNC: 141 MMOL/L (ref 134–144)
TRIGL SERPL-MCNC: 238 MG/DL (ref 0–149)
TSH SERPL DL<=0.005 MIU/L-ACNC: 1.58 UIU/ML (ref 0.45–4.5)
VLDLC SERPL CALC-MCNC: 43 MG/DL (ref 5–40)
WBC # BLD AUTO: 4.3 X10E3/UL (ref 3.4–10.8)

## 2022-10-24 PROBLEM — F17.210 CIGARETTE NICOTINE DEPENDENCE WITHOUT COMPLICATION: Status: ACTIVE | Noted: 2022-10-24

## 2022-10-24 PROBLEM — E78.5 HYPERLIPIDEMIA LDL GOAL <100: Status: ACTIVE | Noted: 2022-10-24

## 2022-10-24 NOTE — ASSESSMENT & PLAN NOTE
LDL elevated, discussed risk vs benefits of statin, agrees to start  The 10-year ASCVD risk score (Ashleigh JAY, et al., 2019) is: 19%  Repeat lipid panel in 3 mos

## 2022-10-24 NOTE — PROGRESS NOTES
Chief Complaint   Patient presents with    Hypertension    Depression     PHQ  18    Results     Discuss lab results    Cyst     Right lower back     Assessment & Plan:     1. Hyperlipidemia LDL goal <100  Assessment & Plan:  LDL elevated, discussed risk vs benefits of statin, agrees to start  The 10-year ASCVD risk score (Ashleigh JAY, et al., 2019) is: 19%  Repeat lipid panel in 3 mos  Orders:  -     LIPID PANEL; Future  -     METABOLIC PANEL, COMPREHENSIVE; Future  -     atorvastatin (LIPITOR) 10 mg tablet; Take 1 Tablet by mouth nightly. Indications: excessive fat in the blood, Normal, Disp-90 Tablet, R-0  2. Essential hypertension  Assessment & Plan:  BP acceptable, goal <130/80, continue regimen  3. Moderate major depression (HCC)  Assessment & Plan:  Persists, start Fluoxetine 10 mg   Take medication as prescribed, do not stop taking unless instructed. Understands that medication may take from 8-12 weeks to take full effect. May experience mild side effects, such as nausea, headache, stomach upset in the first 1-2 weeks of taking the medication but usually subside by week 2. If medication causes drowsiness, switch to night time. If side effects continue beyond 3 weeks, schedule a sooner appointment, otherwise follow-up in 4 weeks as instructed. Medication may cause suicidal ideations and if this occurs, report to the nearest ER  Orders:  -     FLUoxetine (PROzac) 10 mg capsule; Take 1 Capsule by mouth daily. , Normal, Disp-30 Capsule, R-2  4. Chest pain, unspecified type  Comments:  Consult cardiology for possible stress test, given risk factors  Orders:  -     REFERRAL TO CARDIOLOGY  5. Chronic hand pain, right  Assessment & Plan:  Declines PT, trial low-dose Meloxicam, take with food  DO NOT take with other ibuprofen products  May increase to 15 mg if persists  Orders:  -     meloxicam (MOBIC) 7.5 mg tablet; Take 1 Tablet by mouth daily. , Normal, Disp-90 Tablet, R-0  6.  Cigarette nicotine dependence without complication  Assessment & Plan:  Ready to quit, trial nicotine patches for 6 weeks  May titrate down to 14 mg upon successful completion of 21 mg  Orders:  -     nicotine (NICODERM CQ) 21 mg/24 hr; 1 Patch by TransDERmal route every twenty-four (24) hours for 42 days. , Normal, Disp-42 Patch, R-0  7. Sebaceous cyst  -     REFERRAL TO DERMATOLOGY  8. Need for hepatitis C screening test  -     HEPATITIS C AB; Future  9. Screen for colon cancer  Comments:  Given contact info for Dr. Olvin Howard, patient to call and schedule  10. Encounter for immunization  -     PNEUMOCOCCAL, PCV20, PREVNAR 20, (AGE 18 YRS+), IM, PF  11. Influenza vaccination declined  15. Encounter to discuss test results    Follow-up and Dispositions    Return in about 4 weeks (around 11/23/2022) for depression. Subjective:     HPI    Hyperlipidemia  Treatment:  None  Comorbid:   HTN    Hypertension-  Symptoms:  reports chest pain after taking amlodipine  BP readings at home are \"normal\" but does not recall readings  Comorbid: HLD  Current treatment: amlodipine 5 mg    Chest Pain -  Occurred after taking amlodipine  Pain lasts about a couple of hours  Associated with cold sweats but denies any nausea or numbness/tingling of left arm  Had stress test \"a long time ago\"    Depression-  Patient is seen for followup of depression.    Ongoing symptoms include:  see PHQ-9  Current treatment:  None  Has never been on medication before  3 most recent PHQ Screens 10/26/2022 9/28/2022 9/21/2022   Little interest or pleasure in doing things Nearly every day Not at all Not at all   Feeling down, depressed, irritable, or hopeless Several days More than half the days Not at all   Total Score PHQ 2 4 2 0   Trouble falling or staying asleep, or sleeping too much Nearly every day Not at all -   Feeling tired or having little energy Nearly every day Nearly every day -   Poor appetite, weight loss, or overeating Several days Several days -   Feeling bad about yourself - or that you are a failure or have let yourself or your family down Nearly every day More than half the days -   Trouble concentrating on things such as school, work, reading, or watching TV Nearly every day More than half the days -   Moving or speaking so slowly that other people could have noticed; or the opposite being so fidgety that others notice Several days More than half the days -   Thoughts of being better off dead, or hurting yourself in some way Not at all Not at all -   PHQ 9 Score 18 12 -   How difficult have these problems made it for you to do your work, take care of your home and get along with others Extremely difficult Extremely difficult -     Cyst -  Onset:  2-3 years ago  Location: right lower side of back  Associated symptoms:  None  Has not increased in size  Was an open wound at one point but has healed over    Hand Pain -  Was told he had arthritis  Recommended to have PT, declines  Asking for pain medication     Nicotine Dependence-  Years of smoking: since 23 yo  Amount:  1 pack every 2 days  Previous attempt to quit: Yes  Past medications used for cessation: nicotine gum, failed  Current symptoms: SOB, wheezing    Preventive:  COVID-19 vac - received 2 doses  Flu vaccine- declined  Tetanus vac - recommended  Pneumonia vaccine- recommended, will give today  Shingles vaccine- recommended  Colonoscopy- previously referred, given contact info for Dr. Jaswant Andre to schedule  LDCT - will discuss next visit    Review of Systems   Constitutional:  Negative for chills, fever and malaise/fatigue. Respiratory:  Negative for shortness of breath. Cardiovascular:  Negative for chest pain. Musculoskeletal:  Positive for joint pain. Psychiatric/Behavioral:  Positive for depression. Negative for suicidal ideas.       Objective:   /81 (BP 1 Location: Left upper arm, BP Patient Position: Sitting, BP Cuff Size: Large adult)   Pulse 74   Temp 98.1 °F (36.7 °C) (Oral)   Resp 16   Ht 5' 9\" (1.753 m)   Wt 173 lb 9.6 oz (78.7 kg)   SpO2 98%   BMI 25.64 kg/m²      Physical Exam  Vitals and nursing note reviewed. Constitutional:       Appearance: Normal appearance. HENT:      Head: Normocephalic and atraumatic. Cardiovascular:      Rate and Rhythm: Normal rate and regular rhythm. Heart sounds: No murmur heard. No gallop. Pulmonary:      Effort: Pulmonary effort is normal. No respiratory distress. Breath sounds: No wheezing, rhonchi or rales. Musculoskeletal:         General: Normal range of motion. Right hand: No swelling or deformity. Normal range of motion. Left hand: No swelling or deformity. Normal range of motion. Skin:     General: Skin is warm and dry. Neurological:      General: No focal deficit present. Mental Status: He is alert and oriented to person, place, and time. Psychiatric:         Mood and Affect: Mood normal.         Thought Content:  Thought content normal.         Judgment: Judgment normal.        Total time spent:  45 minutes  RAQUEL Lawrence

## 2022-10-26 ENCOUNTER — OFFICE VISIT (OUTPATIENT)
Dept: FAMILY MEDICINE CLINIC | Age: 52
End: 2022-10-26
Payer: MEDICAID

## 2022-10-26 VITALS
TEMPERATURE: 98.1 F | SYSTOLIC BLOOD PRESSURE: 129 MMHG | OXYGEN SATURATION: 98 % | BODY MASS INDEX: 25.71 KG/M2 | WEIGHT: 173.6 LBS | DIASTOLIC BLOOD PRESSURE: 81 MMHG | RESPIRATION RATE: 16 BRPM | HEART RATE: 74 BPM | HEIGHT: 69 IN

## 2022-10-26 DIAGNOSIS — F32.1 MODERATE MAJOR DEPRESSION (HCC): ICD-10-CM

## 2022-10-26 DIAGNOSIS — M79.641 CHRONIC HAND PAIN, RIGHT: ICD-10-CM

## 2022-10-26 DIAGNOSIS — F17.210 CIGARETTE NICOTINE DEPENDENCE WITHOUT COMPLICATION: ICD-10-CM

## 2022-10-26 DIAGNOSIS — L72.3 SEBACEOUS CYST: ICD-10-CM

## 2022-10-26 DIAGNOSIS — Z28.21 INFLUENZA VACCINATION DECLINED: ICD-10-CM

## 2022-10-26 DIAGNOSIS — Z12.11 SCREEN FOR COLON CANCER: ICD-10-CM

## 2022-10-26 DIAGNOSIS — R07.9 CHEST PAIN, UNSPECIFIED TYPE: ICD-10-CM

## 2022-10-26 DIAGNOSIS — G89.29 CHRONIC HAND PAIN, RIGHT: ICD-10-CM

## 2022-10-26 DIAGNOSIS — Z23 ENCOUNTER FOR IMMUNIZATION: ICD-10-CM

## 2022-10-26 DIAGNOSIS — Z11.59 NEED FOR HEPATITIS C SCREENING TEST: ICD-10-CM

## 2022-10-26 DIAGNOSIS — Z71.2 ENCOUNTER TO DISCUSS TEST RESULTS: ICD-10-CM

## 2022-10-26 DIAGNOSIS — E78.5 HYPERLIPIDEMIA LDL GOAL <100: Primary | ICD-10-CM

## 2022-10-26 DIAGNOSIS — I10 ESSENTIAL HYPERTENSION: ICD-10-CM

## 2022-10-26 PROCEDURE — 99215 OFFICE O/P EST HI 40 MIN: CPT | Performed by: NURSE PRACTITIONER

## 2022-10-26 PROCEDURE — 3078F DIAST BP <80 MM HG: CPT | Performed by: NURSE PRACTITIONER

## 2022-10-26 PROCEDURE — 90677 PCV20 VACCINE IM: CPT | Performed by: NURSE PRACTITIONER

## 2022-10-26 PROCEDURE — 3074F SYST BP LT 130 MM HG: CPT | Performed by: NURSE PRACTITIONER

## 2022-10-26 RX ORDER — ATORVASTATIN CALCIUM 10 MG/1
10 TABLET, FILM COATED ORAL
Qty: 90 TABLET | Refills: 0 | Status: SHIPPED | OUTPATIENT
Start: 2022-10-26

## 2022-10-26 RX ORDER — FLUOXETINE 10 MG/1
10 CAPSULE ORAL DAILY
Qty: 30 CAPSULE | Refills: 2 | Status: SHIPPED | OUTPATIENT
Start: 2022-10-26

## 2022-10-26 RX ORDER — IBUPROFEN 200 MG
1 TABLET ORAL EVERY 24 HOURS
Qty: 42 PATCH | Refills: 0 | Status: SHIPPED | OUTPATIENT
Start: 2022-10-26 | End: 2022-12-07

## 2022-10-26 RX ORDER — MELOXICAM 7.5 MG/1
7.5 TABLET ORAL DAILY
Qty: 90 TABLET | Refills: 0 | Status: SHIPPED | OUTPATIENT
Start: 2022-10-26

## 2022-10-26 NOTE — ASSESSMENT & PLAN NOTE
Ready to quit, trial nicotine patches for 6 weeks  May titrate down to 14 mg upon successful completion of 21 mg

## 2022-10-26 NOTE — ASSESSMENT & PLAN NOTE
Persists, start Fluoxetine 10 mg   Take medication as prescribed, do not stop taking unless instructed. Understands that medication may take from 8-12 weeks to take full effect. May experience mild side effects, such as nausea, headache, stomach upset in the first 1-2 weeks of taking the medication but usually subside by week 2. If medication causes drowsiness, switch to night time. If side effects continue beyond 3 weeks, schedule a sooner appointment, otherwise follow-up in 4 weeks as instructed.   Medication may cause suicidal ideations and if this occurs, report to the nearest ER

## 2022-10-26 NOTE — PROGRESS NOTES
Christiano Reynolds presents today for   Chief Complaint   Patient presents with    Hypertension    Depression     PHQ  18    Results     Discuss lab results    Cyst     Right lower back       Christiano Reynolds preferred language for health care discussion is english/other. Is someone accompanying this pt? no    Is the patient using any DME equipment during [address]? no    Depression Screening:  3 most recent PHQ Screens 10/26/2022   Little interest or pleasure in doing things Nearly every day   Feeling down, depressed, irritable, or hopeless Several days   Total Score PHQ 2 4   Trouble falling or staying asleep, or sleeping too much Nearly every day   Feeling tired or having little energy Nearly every day   Poor appetite, weight loss, or overeating Several days   Feeling bad about yourself - or that you are a failure or have let yourself or your family down Nearly every day   Trouble concentrating on things such as school, work, reading, or watching TV Nearly every day   Moving or speaking so slowly that other people could have noticed; or the opposite being so fidgety that others notice Several days   Thoughts of being better off dead, or hurting yourself in some way Not at all   PHQ 9 Score 18   How difficult have these problems made it for you to do your work, take care of your home and get along with others Extremely difficult       Learning Assessment:  Learning Assessment 10/26/2022   PRIMARY LEARNER Patient   HIGHEST LEVEL OF EDUCATION - PRIMARY LEARNER  -   BARRIERS PRIMARY LEARNER -   CO-LEARNER CAREGIVER -   PRIMARY LANGUAGE ENGLISH   LEARNER PREFERENCE PRIMARY DEMONSTRATION     -   ANSWERED BY patient   RELATIONSHIP SELF       Abuse Screening:  No flowsheet data found. Generalized Anxiety  No flowsheet data found.       Health Maintenance Due   Topic Date Due    Hepatitis C Screening  Never done    COVID-19 Vaccine (1) Never done    Pneumococcal 0-64 years (1 - PCV) Never done    DTaP/Tdap/Td series (1 - Tdap) Never done    Colorectal Cancer Screening Combo  Never done    Shingrix Vaccine Age 50> (1 of 2) Never done    Low dose CT lung screening  Never done    Flu Vaccine (1) Never done   . Health Maintenance reviewed and discussed and ordered per Provider. Coordination of Care:  1. Have you been to the ER, urgent care clinic since your last visit? Hospitalized since your last visit? no    2. Have you seen or consulted any other health care providers outside of the [address] [place] [address] since your last visit? Include any pap smears or colon screening.  no

## 2022-10-26 NOTE — ASSESSMENT & PLAN NOTE
Declines PT, trial low-dose Meloxicam, take with food  DO NOT take with other ibuprofen products  May increase to 15 mg if persists

## 2022-11-15 NOTE — PROGRESS NOTES
Patient: Samantha Man                MRN: [id number]       SSN: [id number]  YOB: 1970        AGE: 47 y.o. SEX: male    PCP: Tess Chávez NP  11/16/22    Chief Complaint   Patient presents with    Hip Pain     Rt      HISTORY:  Samantha Man is a 47 y.o. male who is seen for increased right hip pain. He has been experiencing worsening right hip pain for the past several months. He does not recall any injury. He feels pain in his groin and lateral hip with standing and walking. His hip  stiffens up after he sits for long periods of time. He states that he can just be standing and out of nowhere he feels an extremely sharp pain. He has locking spasms of right hip pain with turning suddenly. He reports having back pain as well. He has not yet undergone physical therapy. He is having surgery on his foot with Dr. Bright Rivera to remove calluses. Pain Assessment  10/12/2022   Location of Pain Hip   Location Modifiers Right   Severity of Pain 6   Quality of Pain Sharp; Aching   Duration of Pain Persistent   Frequency of Pain Constant   Aggravating Factors Standing;Walking;Bending   Relieving Factors Nothing     Occupation, etc:  Mr. Sandor Bustos is currently out of work. He trying to get social security benefits for sleep apnea. 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 He lives in [place] with his mother, daughter, brother, and sister. .  Mr. Sandor Bustos weighs 17 lbs and is 5'9\" tall. He is needle phobic.     No results found for: HBA1C, ITJ7QALW, SAX2YQKB, UED7JLJI  Weight Metrics 11/16/2022 10/26/2022 10/12/2022 9/28/2022 7/9/2021 4/29/2019 1/3/2019   Weight 173 lb 173 lb 9.6 oz 178 lb 178 lb 180 lb 185 lb 3.2 oz 178 lb   BMI 25.55 kg/m2 25.64 kg/m2 26.29 kg/m2 26.29 kg/m2 26.58 kg/m2 27.35 kg/m2 26.29 kg/m2       Patient Active Problem List   Diagnosis Code    Moderate major depression ([facility] [address] F32.1    Essential hypertension I10    Chronic hand pain, right M79.641, G89.29    SUSANNA on CPAP G47.33, Z99.89    Hyperlipidemia LDL goal <100 E78.5    Cigarette nicotine dependence without complication C08.597     REVIEW OF SYSTEMS:    Constitutional Symptoms: Negative   Eyes: Negative   Ears, Nose, Throat and Mouth: Negative   Cardiovascular: Negative   Respiratory: Negative   Genitourinary: Per HPI   Gastrointestinal: Per HPI   Integumentary (Skin and/or Breast): Negative   Musculoskeletal: Per HPI   Endocrine/Rheumatologic: Negative   Neurological: Per HPI   Hematology/Lymphatic: Negative    Allergic/Immunologic: Negative   Phychiatric: Negative    Social History     Socioeconomic History    Marital status: SINGLE     Spouse name: Not on file    Number of children: Not on file    Years of education: Not on file    Highest education level: Not on file   Occupational History    Not on file   Tobacco Use    Smoking status: Every Day     Packs/day: 1.00     Years: 35.00     Pack years: 35.00     Types: Cigarettes    Smokeless tobacco: Never   Vaping Use    Vaping Use: Never used   Substance and Sexual Activity    Alcohol use: Yes     Alcohol/week: 4.0 standard drinks     Types: 2 Cans of beer, 2 Standard drinks or equivalent per week    Drug use: No    Sexual activity: Not Currently   Other Topics Concern    Not on file   Social History Narrative    Not on file     Social Determinants of Health     Financial Resource Strain: Not on file   Food Insecurity: Not on file   Transportation Needs: Not on file   Physical Activity: Not on file   Stress: Not on file   Social Connections: Not on file   Intimate Partner Violence: Not on file   Housing Stability: Not on file      No Known Allergies   Current Outpatient Medications   Medication Sig    atorvastatin (LIPITOR) 10 mg tablet Take 1 Tablet by mouth nightly. Indications: excessive fat in the blood    meloxicam (MOBIC) 7.5 mg tablet Take 1 Tablet by mouth daily.     nicotine (NICODERM CQ) 21 mg/24 hr 1 Patch by TransDERmal route every twenty-four (24) hours for 42 days. FLUoxetine (PROzac) 10 mg capsule Take 1 Capsule by mouth daily. amLODIPine (NORVASC) 5 mg tablet Take 1 Tablet by mouth daily. Indications: high blood pressure     Current Facility-Administered Medications   Medication Dose Route Frequency    betamethasone (CELESTONE) injection 3 mg  3 mg Intra artICUlar ONCE      PHYSICAL EXAMINATION:  Visit Vitals  Temp 98.2 °F (36.8 °C) (Temporal)   Ht 5' 9\" (1.753 m)   Wt 173 lb (78.5 kg)   BMI 25.55 kg/m²      ORTHO EXAMINATION:  Examination Right hip Left hip   Skin Intact Intact   External Rotation ROM 10 20   Internal Rotation ROM 10 10   Trochanteric tenderness + posterolateral -   Hip flexion contracture - -   Antalgic gait - -   Trendelenberg sign - -   Lumbar tenderness - -   Straight leg raise - -   Calf tenderness - -   Neurovascular Intact Intact       TIME OUT:  Chart reviewed for the following:   IPeggy MD, have reviewed the History, Physical and updated the Allergic reactions for [address] [place] [address] [employer] performed immediately prior to start of procedure:  Maury Corado MD, have performed the following reviews on [facility] prior to the start of the procedure:          * Patient was identified by name and date of birth   * Agreement on procedure being performed was verified  * Risks and Benefits explained to the patient  * Procedure site verified and marked as necessary  * Patient was positioned for comfort  * Consent was obtained     Time: 11:11 AM     Date of procedure: 11/16/2022  Procedure performed by:  Peggy Carrillo MD  Mr. Baldemar Sewell tolerated the procedure well with no complications. RADIOGRAPHS:  XR BILAT HIPS 10/12/22 JOHANNA  IMPRESSION:  AP pelvis and two views - No fractures, moderate joint space narrowing, + osteophytes present. Tonnis grade 2. DUANE    IMPRESSION:      ICD-10-CM ICD-9-CM    1.  Primary osteoarthritis of right hip M16.11 715.15 DRAIN/INJECT LARGE JOINT/BURSA      REFERRAL TO PHYSICAL THERAPY      betamethasone (CELESTONE) injection 3 mg      2. Femoroacetabular impingement  M25.859 719.85 DRAIN/INJECT LARGE JOINT/BURSA      REFERRAL TO PHYSICAL THERAPY      betamethasone (CELESTONE) injection 3 mg      3. Trochanteric bursitis, right hip  M70.61 726.5 DRAIN/INJECT LARGE JOINT/BURSA      REFERRAL TO PHYSICAL THERAPY      betamethasone (CELESTONE) injection 3 mg        PLAN:  After discussing treatment options, patient's right trochanteric bursa was injected with 4 cc Marcaine and 1/2 cc Celestone. He will start a brief course of outpatient physical therapy. He will follow up as needed.       Scribed by Burton Adhikari (Clarnce Brazos) as dictated by Gautam Potter MD

## 2022-11-16 ENCOUNTER — OFFICE VISIT (OUTPATIENT)
Dept: ORTHOPEDIC SURGERY | Age: 52
End: 2022-11-16
Payer: MEDICAID

## 2022-11-16 VITALS — BODY MASS INDEX: 25.62 KG/M2 | WEIGHT: 173 LBS | TEMPERATURE: 98.2 F | HEIGHT: 69 IN

## 2022-11-16 DIAGNOSIS — M16.11 PRIMARY OSTEOARTHRITIS OF RIGHT HIP: Primary | ICD-10-CM

## 2022-11-16 DIAGNOSIS — M25.859 FEMOROACETABULAR IMPINGEMENT: ICD-10-CM

## 2022-11-16 DIAGNOSIS — M70.61 TROCHANTERIC BURSITIS, RIGHT HIP: ICD-10-CM

## 2022-11-16 PROCEDURE — 99213 OFFICE O/P EST LOW 20 MIN: CPT | Performed by: SPECIALIST

## 2022-11-16 PROCEDURE — 20610 DRAIN/INJ JOINT/BURSA W/O US: CPT | Performed by: SPECIALIST

## 2022-11-16 RX ORDER — BETAMETHASONE SODIUM PHOSPHATE AND BETAMETHASONE ACETATE 3; 3 MG/ML; MG/ML
3 INJECTION, SUSPENSION INTRA-ARTICULAR; INTRALESIONAL; INTRAMUSCULAR; SOFT TISSUE ONCE
Status: COMPLETED | OUTPATIENT
Start: 2022-11-16 | End: 2022-11-16

## 2022-11-16 RX ADMIN — BETAMETHASONE SODIUM PHOSPHATE AND BETAMETHASONE ACETATE 3 MG: 3; 3 INJECTION, SUSPENSION INTRA-ARTICULAR; INTRALESIONAL; INTRAMUSCULAR; SOFT TISSUE at 11:20

## 2022-11-21 ENCOUNTER — APPOINTMENT (OUTPATIENT)
Dept: PHYSICAL THERAPY | Age: 52
End: 2022-11-21
Attending: SPECIALIST
Payer: MEDICAID

## 2022-11-22 ENCOUNTER — HOSPITAL ENCOUNTER (OUTPATIENT)
Dept: PHYSICAL THERAPY | Age: 52
Discharge: HOME | End: 2022-11-22
Attending: SPECIALIST
Payer: MEDICAID

## 2022-11-22 PROCEDURE — 97162 PT EVAL MOD COMPLEX 30 MIN: CPT

## 2022-11-22 NOTE — PROGRESS NOTES
Chief Complaint   Patient presents with    Depression     Assessment & Plan:     1. Moderate major depression (HCC)  Assessment & Plan:  Improved on Fluoxetine, continue current dose  Re-evaluate in 4 weeks and if increase dose then if indicated  2. Essential hypertension  Assessment & Plan:  BP recheck improved, continue regimen  3. Screen for colon cancer  Comments:  Scheduled in January    Follow-up and Dispositions    Return in about 4 weeks (around 12/21/2022) for depression and   Return in 2 mos for cholesterol, blood pressure, depression, lab results       Subjective:     HPI    Depression-  Patient is seen for followup of depression.    Ongoing symptoms include:  see PHQ-9  Current treatment: started on Fluoxetine 10 mg four weeks ago  States his depression is improving  3 most recent [facility] [place] [facility] Screens 11/23/2022 10/26/2022 9/28/2022   Little interest or pleasure in doing things Several days Nearly every day Not at all   Feeling down, depressed, irritable, or hopeless Several days Several days More than half the days   Total Score PHQ 2 2 4 2   Trouble falling or staying asleep, or sleeping too much Nearly every day Nearly every day Not at all   Feeling tired or having little energy More than half the days Nearly every day Nearly every day   Poor appetite, weight loss, or overeating Several days Several days Several days   Feeling bad about yourself - or that you are a failure or have let yourself or your family down Not at all Nearly every day More than half the days   Trouble concentrating on things such as school, work, reading, or watching TV More than half the days Nearly every day More than half the days   Moving or speaking so slowly that other people could have noticed; or the opposite being so fidgety that others notice Not at all Several days More than half the days   Thoughts of being better off dead, or hurting yourself in some way Not at all Not at all Not at all   PHQ 9 Score 10 18 12   How difficult have these problems made it for you to do your work, take care of your home and get along with others Somewhat difficult Extremely difficult Extremely difficult     Hypertension-  Symptoms:  None  BP readings at home are not being checked, has to get a monitor  Comorbid: HLD  Current treatment: amlodipine 5 mg  Has an appointment with Dr. Yomaira Gillis on 12/21/2022    Health Maintenance:  COVID-19 vac - received 2 doses  Tetanus vac - recommended  Shingles vaccine- recommended  Colonoscopy- scheduled in January  LDCT - will discuss next visit      Review of Systems   Constitutional:  Negative for chills, fever and malaise/fatigue. Respiratory:  Negative for shortness of breath. Cardiovascular:  Negative for chest pain. Objective:   /77   Pulse 91   Temp 97.9 °F (36.6 °C) (Oral)   Resp 17   Ht 5' 9\" (1.753 m)   Wt 176 lb (79.8 kg)   SpO2 100%   BMI 25.99 kg/m²      Physical Exam  Vitals and nursing note reviewed. Constitutional:       Appearance: Normal appearance. HENT:      Head: Normocephalic and atraumatic. Cardiovascular:      Rate and Rhythm: Normal rate and regular rhythm. Heart sounds: No murmur heard. No gallop. Pulmonary:      Effort: Pulmonary effort is normal. No respiratory distress. Breath sounds: No wheezing, rhonchi or rales. Musculoskeletal:         General: Normal range of motion. Skin:     General: Skin is warm and dry. Neurological:      General: No focal deficit present. Mental Status: He is alert and oriented to person, place, and time. Psychiatric:         Mood and Affect: Mood normal.         Thought Content:  Thought content normal.         Judgment: Judgment normal.          RAQUEL Pace

## 2022-11-22 NOTE — PROGRESS NOTES
[facility]  [address]  [place], [address]  [phone] fax    Plan of Care/ Statement of Necessity for Physical Therapy Services    Patient name: Jose Villa Start of Care: 2022   Referral source: Stevie Boles MD : 1970    Medical Diagnosis: Pain in right hip [M25.551]  Payor: BLUE CROSS MEDICAID / Plan: [address] [place] [address] / Product Type: Managed Care Medicaid /  Onset Date:7 years ago    Treatment Diagnosis: Right hip pain   Prior Hospitalization: see medical history Provider#: [id number]   Medications: Verified on Patient summary List    Comorbidities: Heart Disease, HTN, Tobacco use. Prior Level of Function: The patient reports improved ease of pivoting and performing chore production. The Plan of Care and following information is based on the information from the initial evaluation. Assessment/ key information: The patient is a 47year old male with a chief complaint of right hip pain that has been ongoing for the last 7 years, but recently worsening. He states his pain comes and goes, but he feels it most with pivoting and and turning quickly in the lateral hip. The patient has had radiographs and a recent injection with benefit. He is currently not working. The patient has signs and symptoms that appear consistent with mechanical right hip pain with impairments consisting of pain, decreased ROM, decreased flexibility, decreased strength, limited ADL ease. The patient does present with limited end feel and attains greater degrees of AROM than PROM limiting assessment. He will benefit from a trial of PT in order to address the aforementioned impairments.      Evaluation Complexity History MEDIUM  Complexity : 1-2 comorbidities / personal factors will impact the outcome/ POC ; Examination MEDIUM Complexity : 3 Standardized tests and measures addressing body structure, function, activity limitation and / or participation in recreation  ;Presentation MEDIUM Complexity : Evolving with changing characteristics  ; Clinical Decision Making MEDIUM Complexity : FOTO score of 26-74  Overall Complexity Rating: MEDIUM  Problem List: pain affecting function, decrease ROM, decrease strength, impaired gait/ balance, decrease ADL/ functional abilitiies, decrease activity tolerance, decrease flexibility/ joint mobility, and decrease transfer abilities   Treatment Plan may include any combination of the following: Therapeutic exercise, Neuromuscular reeducation, Manual therapy, Therapeutic activity, Self care/home management, and Electric stim unattended   Patient / Family readiness to learn indicated by: asking questions, trying to perform skills, and interest  Persons(s) to be included in education: patient (P)  Barriers to Learning/Limitations: None  Patient Goal (s): pain go away  Patient Self Reported Health Status: fair  Rehabilitation Potential: good    Short Term Goals: To be accomplished in 2 weeks:   1. The patient will demonstrate independence and compliance with HEP to maximize therapeutic benefit. 2. The patient will improve right hip AAROM to 110 degrees flexion to improve ease of transfers. Long Term Goals: To be accomplished in 4 weeks:   1. The patient improve FOTO score to 60 to improve ease of ADLs. 2. The patient will improve hip ABD strength to 4+/5 MMT to maximize ease of ADLs. 3. The patient will improve hip ABD ROM to 35 degrees to improve ease of car transfers. 4. The patient will improve quad flexibility to 95 degrees right to improve ease of ADLs. Frequency / Duration: Patient to be seen 2 times per week for 4 weeks.     Patient/ Caregiver education and instruction: Diagnosis, prognosis, self care, activity modification, and exercises   [x]  Plan of care has been reviewed with MIMI Dickey, PT 11/22/2022 1:50 PM    ________________________________________________________________________    I certify that the above Therapy Services are being furnished while the patient is under my care. I agree with the treatment plan and certify that this therapy is necessary.     [address] [place] [address] Signature:____________Date:_________TIME:________     Annette Tariq MD  ** Signature, Date and Time must be completed for valid certification **    Please sign and return to [facility] [address] [place] [address] & [facility]  [address] [place], [facility] [address]  [phone] fax

## 2022-11-22 NOTE — PROGRESS NOTES
PT DAILY TREATMENT NOTE     Patient Name: Elver Dover  Date:2022  : 1970  [x]  Patient  Verified  Payor: BLUE CROSS MEDICAID / Plan: [facility] HEALTHKEEPERS PLUS / Product Type: Managed Care Medicaid /    In time:11:42  Out time: 1:20  Total Treatment Time (min): 38  Visit #: 1 of 8    Medicare/BCBS Only   Total Timed Codes (min):  15 1:1 Treatment Time:  15     Treatment Area: Pain in right hip [M25.551]    SUBJECTIVE  Pain Level (0-10 scale): 0/10  Any medication changes, allergies to medications, adverse drug reactions, diagnosis change, or new procedure performed?: [x] No    [] Yes (see summary sheet for update)  Subjective functional status/changes:   [] No changes reported  The patient has a chief complaint of right hip pain that happens when he \"moves fast\". OBJECTIVE  23 min [x]Eval                  []Re-Eval       15 min Therapeutic Exercise:  [x] See flow sheet :   Rationale: increase ROM and increase strength to improve the patients ability to improve ADL ease. With   [] TE   [] TA   [] neuro   [] other: Patient Education: [x] Review HEP    [] Progressed/Changed HEP based on:   [] positioning   [] body mechanics   [] transfers   [] heat/ice application    [] other:      Other Objective/Functional Measures: See IE     Pain Level (0-10 scale) post treatment: 0/10    ASSESSMENT/Changes in Function: See POC    Patient will continue to benefit from skilled PT services to modify and progress therapeutic interventions, address functional mobility deficits, address ROM deficits, address strength deficits, analyze and address soft tissue restrictions, analyze and cue movement patterns, analyze and modify body mechanics/ergonomics, assess and modify postural abnormalities, and instruct in home and community integration to attain remaining goals.      [x]  See Plan of Care  []  See progress note/recertification  []  See Discharge Summary         Progress towards goals / Updated goals:  Short Term Goals: To be accomplished in 2 weeks:               1. The patient will demonstrate independence and compliance with HEP to maximize therapeutic benefit. IE: issued HEP               2. The patient will improve right hip AAROM to 110 degrees flexion to improve ease of transfers. IE: 90 degrees  Long Term Goals: To be accomplished in 4 weeks:               1. The patient improve FOTO score to 60 to improve ease of ADLs. IE: 52               2. The patient will improve hip ABD strength to 4+/5 MMT to maximize ease of ADLs. IE: 3-/5 MMT               3. The patient will improve hip ABD ROM to 35 degrees to improve ease of car transfers. IE: 20 degrees               4. The patient will improve quad flexibility to 95 degrees right to improve ease of ADLs.    IE: 70 degrees    PLAN  [x]  Upgrade activities as tolerated     []  Continue plan of care  []  Update interventions per flow sheet       []  Discharge due to:_  []  Other:_      Tom Wiggins, PT 11/22/2022  2:01 PM    Future Appointments   Date Time Provider Nannette Gutierrez   11/23/2022  9:45 AM PEACE Roberto BS AMB   12/21/2022  9:20 AM Tien BRICEÑO DO [facility] BS AMB   1/25/2023 10:00 AM PEACE Roberto BS AMB

## 2022-11-23 ENCOUNTER — OFFICE VISIT (OUTPATIENT)
Dept: FAMILY MEDICINE CLINIC | Age: 52
End: 2022-11-23
Payer: MEDICAID

## 2022-11-23 VITALS
DIASTOLIC BLOOD PRESSURE: 77 MMHG | BODY MASS INDEX: 26.07 KG/M2 | HEART RATE: 91 BPM | RESPIRATION RATE: 17 BRPM | OXYGEN SATURATION: 100 % | HEIGHT: 69 IN | TEMPERATURE: 97.9 F | WEIGHT: 176 LBS | SYSTOLIC BLOOD PRESSURE: 118 MMHG

## 2022-11-23 DIAGNOSIS — Z12.11 SCREEN FOR COLON CANCER: ICD-10-CM

## 2022-11-23 DIAGNOSIS — I10 ESSENTIAL HYPERTENSION: ICD-10-CM

## 2022-11-23 DIAGNOSIS — F32.1 MODERATE MAJOR DEPRESSION (HCC): Primary | ICD-10-CM

## 2022-11-23 PROCEDURE — 3078F DIAST BP <80 MM HG: CPT | Performed by: NURSE PRACTITIONER

## 2022-11-23 PROCEDURE — 3074F SYST BP LT 130 MM HG: CPT | Performed by: NURSE PRACTITIONER

## 2022-11-23 PROCEDURE — 99214 OFFICE O/P EST MOD 30 MIN: CPT | Performed by: NURSE PRACTITIONER

## 2022-11-23 NOTE — PROGRESS NOTES
Peyton Stubbs presents today for   Chief Complaint   Patient presents with    Depression       Is someone accompanying this pt? no    Is the patient using any DME equipment during OV? no    Depression Screening:  3 most recent PHQ Screens 11/23/2022   Little interest or pleasure in doing things Several days   Feeling down, depressed, irritable, or hopeless Several days   Total Score PHQ 2 2   Trouble falling or staying asleep, or sleeping too much Nearly every day   Feeling tired or having little energy More than half the days   Poor appetite, weight loss, or overeating Several days   Feeling bad about yourself - or that you are a failure or have let yourself or your family down Not at all   Trouble concentrating on things such as school, work, reading, or watching TV More than half the days   Moving or speaking so slowly that other people could have noticed; or the opposite being so fidgety that others notice Not at all   Thoughts of being better off dead, or hurting yourself in some way Not at all   PHQ 9 Score 10   How difficult have these problems made it for you to do your work, take care of your home and get along with others Somewhat difficult       Learning Assessment:  Learning Assessment 10/26/2022   PRIMARY LEARNER Patient   HIGHEST LEVEL OF EDUCATION - PRIMARY LEARNER  -   BARRIERS PRIMARY LEARNER -   CO-LEARNER CAREGIVER -   PRIMARY LANGUAGE ENGLISH   LEARNER PREFERENCE PRIMARY DEMONSTRATION     -   ANSWERED BY patient   RELATIONSHIP SELF       Fall Risk  No flowsheet data found. ADL  No flowsheet data found. Travel Screening:    Travel Screening       Question Response    In the last 10 days, have you been in contact with someone who was confirmed or suspected to have Coronavirus/COVID-19? No / Unsure    Have you had a COVID-19 viral test in the last 10 days? No    Do you have any of the following new or worsening symptoms?  None of these    Have you traveled internationally or domestically in the last month? No          Travel History   Travel since 10/23/22    No documented travel since 10/23/22         Health Maintenance reviewed and discussed and ordered per Provider. Health Maintenance Due   Topic Date Due    Hepatitis C Screening  Never done    DTaP/Tdap/Td series (1 - Tdap) Never done    Colorectal Cancer Screening Combo  Never done    Shingrix Vaccine Age 50> (1 of 2) Never done    Low dose CT lung screening  Never done    COVID-19 Vaccine (2 - Moderna series) 09/02/2021   . Coordination of Care:  1. Have you been to the ER, urgent care clinic since your last visit? Hospitalized since your last visit? no    2. Have you seen or consulted any other health care providers outside of the [address] [place] [address] since your last visit? Include any pap smears or colon screening.  no

## 2022-11-23 NOTE — ASSESSMENT & PLAN NOTE
Improved on Fluoxetine, continue current dose  Re-evaluate in 4 weeks and if increase dose then if indicated

## 2022-12-08 ENCOUNTER — APPOINTMENT (OUTPATIENT)
Dept: PHYSICAL THERAPY | Age: 52
End: 2022-12-08
Attending: SPECIALIST

## 2022-12-12 ENCOUNTER — TELEPHONE (OUTPATIENT)
Dept: PHYSICAL THERAPY | Age: 52
End: 2022-12-12

## 2022-12-26 DIAGNOSIS — I10 ESSENTIAL HYPERTENSION: ICD-10-CM

## 2022-12-28 RX ORDER — AMLODIPINE BESYLATE 5 MG/1
TABLET ORAL
Qty: 90 TABLET | Refills: 0 | Status: SHIPPED | OUTPATIENT
Start: 2022-12-28

## 2023-01-23 NOTE — PROGRESS NOTES
Chief Complaint   Patient presents with    Cholesterol Problem    Hypertension    Depression     Assessment & Plan:     1. Acute cough  Comments:  Counseled on smoking cessation  Start abx, steroids, Tessalon Perles, Albuterol  CXR if no improvement  Orders:  -     azithromycin (ZITHROMAX) 250 mg tablet; Take 2 tablets today, then take 1 tablet daily, Normal, Disp-6 Tablet, R-0  -     predniSONE (DELTASONE) 20 mg tablet; Take 2 tablets by mouth once a day for 5 days, Normal, Disp-10 Tablet, R-0  -     benzonatate (TESSALON) 100 mg capsule; Take 1 Capsule by mouth three (3) times daily as needed for Cough for up to 7 days. , Normal, Disp-21 Capsule, R-0  -     albuterol (PROVENTIL HFA, VENTOLIN HFA, PROAIR HFA) 90 mcg/actuation inhaler; Take 2 Puffs by inhalation every four (4) hours as needed for Wheezing., Normal, Disp-18 g, R-2  2. Hyperlipidemia LDL goal <100  Assessment & Plan:  Overdue for repeat labs, advised to complete  Orders:  -     atorvastatin (LIPITOR) 10 mg tablet; Take 1 Tablet by mouth nightly. Indications: excessive fat in the blood, Normal, Disp-90 Tablet, R-0  3. Essential hypertension  Assessment & Plan:  Manual recheck better, continue regimen for now  4. Moderate major depression (HCC)  Assessment & Plan:  Mild improvement, increase Fluoxetine to 20 mg per patient request  Orders:  -     FLUoxetine (PROzac) 20 mg capsule; Take 1 Capsule by mouth daily. , Normal, Disp-90 Capsule, R-0Dose adjustment, please discontinue 10 mg dose. Thanks. 5. Screen for colon cancer  Comments:  Scheduled for March    Follow-up and Dispositions    Return in about 4 weeks (around 2/22/2023) for blood pressure, cholesterol, depression, lab results.        Subjective:     HPI    Hyperlipidemia  Treatment:  None  Comorbid:   HTN     Hypertension-  Symptoms:  reports chest pain after taking amlodipine  BP readings at home are \"normal\" but does not recall readings  Comorbid: HLD  Current treatment: amlodipine 5 mg    Depression-  Patient is seen for followup of depression. Ongoing symptoms include:  see PHQ-9  Current treatment: Fluoxetine 10 mg started 8 weeks ago  3 most recent [facility] Screens 1/25/2023 11/23/2022 10/26/2022   Little interest or pleasure in doing things Not at all Several days Nearly every day   Feeling down, depressed, irritable, or hopeless Several days Several days Several days   Total Score PHQ 2 1 2 4   Trouble falling or staying asleep, or sleeping too much Several days Nearly every day Nearly every day   Feeling tired or having little energy Several days More than half the days Nearly every day   Poor appetite, weight loss, or overeating Several days Several days Several days   Feeling bad about yourself - or that you are a failure or have let yourself or your family down Not at all Not at all Nearly every day   Trouble concentrating on things such as school, work, reading, or watching TV More than half the days More than half the days Nearly every day   Moving or speaking so slowly that other people could have noticed; or the opposite being so fidgety that others notice Not at all Not at all Several days   Thoughts of being better off dead, or hurting yourself in some way Not at all Not at all Not at all   PHQ 9 Score 6 10 18   How difficult have these problems made it for you to do your work, take care of your home and get along with others Very difficult Somewhat difficult Extremely difficult     Cough -  Onset:  November  Cough is non-productive  Also wheezing and some shortness of breath  Current smoker, 1 ppd  Denies any fever/chills, n/v/d  Has taken Mucinex for 14 days, no improvement    Health Maintenance:  COVID-19 vac - received 2 doses  Tetanus vac - recommended  Shingles vaccine- recommended  Colonoscopy- rescheduled in March  LDCT - will discuss next visit      Review of Systems   Constitutional:  Negative for chills, fever and malaise/fatigue.    Respiratory:  Positive for cough, shortness of breath and wheezing. Negative for sputum production. Cardiovascular:  Negative for chest pain. Gastrointestinal:  Negative for diarrhea, nausea and vomiting. Musculoskeletal:  Negative for myalgias. Neurological:  Negative for dizziness and headaches. Objective:   /80 Comment: manually  Pulse 82   Temp 98 °F (36.7 °C) (Oral)   Resp 17   Ht 5' 9\" (1.753 m)   Wt 173 lb (78.5 kg)   SpO2 99%   BMI 25.55 kg/m²      Physical Exam  Vitals and nursing note reviewed. Constitutional:       Appearance: Normal appearance. HENT:      Head: Normocephalic and atraumatic. Cardiovascular:      Rate and Rhythm: Normal rate and regular rhythm. Heart sounds: No murmur heard. No gallop. Pulmonary:      Effort: Pulmonary effort is normal. No respiratory distress. Breath sounds: No wheezing, rhonchi or rales. Musculoskeletal:         General: Normal range of motion. Skin:     General: Skin is warm and dry. Neurological:      General: No focal deficit present. Mental Status: He is alert and oriented to person, place, and time. Psychiatric:         Mood and Affect: Mood normal.         Thought Content:  Thought content normal.         Judgment: Judgment normal.          Monica Wiley, EDINSON-C

## 2023-01-25 ENCOUNTER — OFFICE VISIT (OUTPATIENT)
Dept: FAMILY MEDICINE CLINIC | Age: 53
End: 2023-01-25

## 2023-01-25 VITALS
TEMPERATURE: 98 F | HEIGHT: 69 IN | OXYGEN SATURATION: 99 % | HEART RATE: 82 BPM | SYSTOLIC BLOOD PRESSURE: 120 MMHG | DIASTOLIC BLOOD PRESSURE: 80 MMHG | WEIGHT: 173 LBS | RESPIRATION RATE: 17 BRPM | BODY MASS INDEX: 25.62 KG/M2

## 2023-01-25 DIAGNOSIS — F32.1 MODERATE MAJOR DEPRESSION (HCC): ICD-10-CM

## 2023-01-25 DIAGNOSIS — E78.5 HYPERLIPIDEMIA LDL GOAL <100: ICD-10-CM

## 2023-01-25 DIAGNOSIS — I10 ESSENTIAL HYPERTENSION: ICD-10-CM

## 2023-01-25 DIAGNOSIS — R05.1 ACUTE COUGH: Primary | ICD-10-CM

## 2023-01-25 DIAGNOSIS — Z12.11 SCREEN FOR COLON CANCER: ICD-10-CM

## 2023-01-25 PROCEDURE — 99215 OFFICE O/P EST HI 40 MIN: CPT | Performed by: NURSE PRACTITIONER

## 2023-01-25 PROCEDURE — 3079F DIAST BP 80-89 MM HG: CPT | Performed by: NURSE PRACTITIONER

## 2023-01-25 PROCEDURE — 3074F SYST BP LT 130 MM HG: CPT | Performed by: NURSE PRACTITIONER

## 2023-01-25 RX ORDER — ALBUTEROL SULFATE 90 UG/1
2 AEROSOL, METERED RESPIRATORY (INHALATION)
Qty: 18 G | Refills: 2 | Status: SHIPPED | OUTPATIENT
Start: 2023-01-25

## 2023-01-25 RX ORDER — ATORVASTATIN CALCIUM 10 MG/1
10 TABLET, FILM COATED ORAL
Qty: 90 TABLET | Refills: 0 | Status: SHIPPED | OUTPATIENT
Start: 2023-01-25

## 2023-01-25 RX ORDER — FLUOXETINE HYDROCHLORIDE 20 MG/1
20 CAPSULE ORAL DAILY
Qty: 90 CAPSULE | Refills: 0 | Status: SHIPPED | OUTPATIENT
Start: 2023-01-25

## 2023-01-25 RX ORDER — PREDNISONE 20 MG/1
TABLET ORAL
Qty: 10 TABLET | Refills: 0 | Status: SHIPPED | OUTPATIENT
Start: 2023-01-25

## 2023-01-25 RX ORDER — AZITHROMYCIN 250 MG/1
TABLET, FILM COATED ORAL
Qty: 6 TABLET | Refills: 0 | Status: SHIPPED | OUTPATIENT
Start: 2023-01-25

## 2023-01-25 RX ORDER — BENZONATATE 100 MG/1
100 CAPSULE ORAL
Qty: 21 CAPSULE | Refills: 0 | Status: SHIPPED | OUTPATIENT
Start: 2023-01-25 | End: 2023-02-01

## 2023-01-25 NOTE — PROGRESS NOTES
Ivett Frausto presents today for   Chief Complaint   Patient presents with    Cholesterol Problem    Hypertension    Depression       Is someone accompanying this pt? no    Is the patient using any DME equipment during OV? no    Depression Screening:  3 most recent PHQ Screens 1/25/2023   Little interest or pleasure in doing things Not at all   Feeling down, depressed, irritable, or hopeless Several days   Total Score PHQ 2 1   Trouble falling or staying asleep, or sleeping too much Several days   Feeling tired or having little energy Several days   Poor appetite, weight loss, or overeating Several days   Feeling bad about yourself - or that you are a failure or have let yourself or your family down Not at all   Trouble concentrating on things such as school, work, reading, or watching TV More than half the days   Moving or speaking so slowly that other people could have noticed; or the opposite being so fidgety that others notice Not at all   Thoughts of being better off dead, or hurting yourself in some way Not at all   PHQ 9 Score 6   How difficult have these problems made it for you to do your work, take care of your home and get along with others Very difficult       Learning Assessment:  Learning Assessment 1/25/2023   PRIMARY LEARNER Patient   HIGHEST LEVEL OF EDUCATION - PRIMARY LEARNER  GRADUATED HIGH SCHOOL OR GED   BARRIERS PRIMARY LEARNER NONE   CO-LEARNER CAREGIVER No   PRIMARY LANGUAGE ENGLISH   LEARNER PREFERENCE PRIMARY LISTENING     -   ANSWERED BY patient   RELATIONSHIP SELF       Fall Risk  No flowsheet data found. ADL  No flowsheet data found. Travel Screening:    Travel Screening       Question Response    In the last 10 days, have you been in contact with someone who was confirmed or suspected to have Coronavirus/COVID-19? No / Unsure    Have you had a COVID-19 viral test in the last 10 days? No    Do you have any of the following new or worsening symptoms?  None of these    Have you traveled internationally or domestically in the last month? No          Travel History   Travel since 12/25/22    No documented travel since 12/25/22         Health Maintenance reviewed and discussed and ordered per Provider. Health Maintenance Due   Topic Date Due    Hepatitis C Screening  Never done    DTaP/Tdap/Td series (1 - Tdap) Never done    Colorectal Cancer Screening Combo  Never done    Shingles Vaccine (1 of 2) Never done    Low dose CT lung screening  Never done    COVID-19 Vaccine (2 - Moderna series) 09/02/2021   . Coordination of Care:  1. Have you been to the ER, urgent care clinic since your last visit? Hospitalized since your last visit? no    2. Have you seen or consulted any other health care providers outside of the [address] [place] [address] since your last visit? Include any pap smears or colon screening.  no

## 2023-02-16 ENCOUNTER — TELEPHONE (OUTPATIENT)
Age: 53
End: 2023-02-16

## 2023-02-16 NOTE — TELEPHONE ENCOUNTER
Mr. Janelle Coyne called and said he is a little better but still has a lot of congestion and coughing up phlegm. They won't schedule his colonoscopy until he is better. Pt is requesting that you refill prednisone, erythromycin, and cough med to [facility] on [address]. Pt is also asking if you can send a referral for a dr to treat COPD.   Please advise

## 2023-02-17 ENCOUNTER — TELEMEDICINE (OUTPATIENT)
Age: 53
End: 2023-02-17
Payer: MEDICAID

## 2023-02-17 ENCOUNTER — TELEPHONE (OUTPATIENT)
Age: 53
End: 2023-02-17

## 2023-02-17 DIAGNOSIS — I10 ESSENTIAL HYPERTENSION: ICD-10-CM

## 2023-02-17 DIAGNOSIS — R05.1 ACUTE COUGH: Primary | ICD-10-CM

## 2023-02-17 DIAGNOSIS — F17.210 CIGARETTE NICOTINE DEPENDENCE WITHOUT COMPLICATION: ICD-10-CM

## 2023-02-17 DIAGNOSIS — Z12.11 SCREEN FOR COLON CANCER: ICD-10-CM

## 2023-02-17 PROCEDURE — 99214 OFFICE O/P EST MOD 30 MIN: CPT | Performed by: NURSE PRACTITIONER

## 2023-02-17 RX ORDER — FLUOXETINE HYDROCHLORIDE 20 MG/1
CAPSULE ORAL
COMMUNITY
Start: 2023-01-25

## 2023-02-17 RX ORDER — PREDNISONE 20 MG/1
20 TABLET ORAL 2 TIMES DAILY
Qty: 10 TABLET | Refills: 0 | Status: SHIPPED | OUTPATIENT
Start: 2023-02-17 | End: 2023-02-22

## 2023-02-17 RX ORDER — AMLODIPINE BESYLATE 5 MG/1
5 TABLET ORAL DAILY
Qty: 90 TABLET | Refills: 0 | Status: SHIPPED | OUTPATIENT
Start: 2023-02-17

## 2023-02-17 RX ORDER — ALBUTEROL SULFATE 90 UG/1
2 AEROSOL, METERED RESPIRATORY (INHALATION)
COMMUNITY
Start: 2023-01-25

## 2023-02-17 RX ORDER — AZITHROMYCIN 250 MG/1
250 TABLET, FILM COATED ORAL SEE ADMIN INSTRUCTIONS
Qty: 6 TABLET | Refills: 0 | Status: SHIPPED | OUTPATIENT
Start: 2023-02-17 | End: 2023-02-22

## 2023-02-17 SDOH — ECONOMIC STABILITY: FOOD INSECURITY: WITHIN THE PAST 12 MONTHS, THE FOOD YOU BOUGHT JUST DIDN'T LAST AND YOU DIDN'T HAVE MONEY TO GET MORE.: NEVER TRUE

## 2023-02-17 SDOH — ECONOMIC STABILITY: HOUSING INSECURITY
IN THE LAST 12 MONTHS, WAS THERE A TIME WHEN YOU DID NOT HAVE A STEADY PLACE TO SLEEP OR SLEPT IN A SHELTER (INCLUDING NOW)?: NO

## 2023-02-17 SDOH — ECONOMIC STABILITY: FOOD INSECURITY: WITHIN THE PAST 12 MONTHS, YOU WORRIED THAT YOUR FOOD WOULD RUN OUT BEFORE YOU GOT MONEY TO BUY MORE.: NEVER TRUE

## 2023-02-17 SDOH — ECONOMIC STABILITY: INCOME INSECURITY: HOW HARD IS IT FOR YOU TO PAY FOR THE VERY BASICS LIKE FOOD, HOUSING, MEDICAL CARE, AND HEATING?: NOT HARD AT ALL

## 2023-02-17 ASSESSMENT — PATIENT HEALTH QUESTIONNAIRE - PHQ9
9. THOUGHTS THAT YOU WOULD BE BETTER OFF DEAD, OR OF HURTING YOURSELF: 0
10. IF YOU CHECKED OFF ANY PROBLEMS, HOW DIFFICULT HAVE THESE PROBLEMS MADE IT FOR YOU TO DO YOUR WORK, TAKE CARE OF THINGS AT HOME, OR GET ALONG WITH OTHER PEOPLE: 0
4. FEELING TIRED OR HAVING LITTLE ENERGY: 0
SUM OF ALL RESPONSES TO PHQ QUESTIONS 1-9: 0
7. TROUBLE CONCENTRATING ON THINGS, SUCH AS READING THE NEWSPAPER OR WATCHING TELEVISION: 0
5. POOR APPETITE OR OVEREATING: 0
SUM OF ALL RESPONSES TO PHQ QUESTIONS 1-9: 0
SUM OF ALL RESPONSES TO PHQ QUESTIONS 1-9: 0
2. FEELING DOWN, DEPRESSED OR HOPELESS: 0
3. TROUBLE FALLING OR STAYING ASLEEP: 0
SUM OF ALL RESPONSES TO PHQ9 QUESTIONS 1 & 2: 0
1. LITTLE INTEREST OR PLEASURE IN DOING THINGS: 0
SUM OF ALL RESPONSES TO PHQ QUESTIONS 1-9: 0
6. FEELING BAD ABOUT YOURSELF - OR THAT YOU ARE A FAILURE OR HAVE LET YOURSELF OR YOUR FAMILY DOWN: 0
8. MOVING OR SPEAKING SO SLOWLY THAT OTHER PEOPLE COULD HAVE NOTICED. OR THE OPPOSITE, BEING SO FIGETY OR RESTLESS THAT YOU HAVE BEEN MOVING AROUND A LOT MORE THAN USUAL: 0

## 2023-02-17 ASSESSMENT — ENCOUNTER SYMPTOMS
DIARRHEA: 0
SORE THROAT: 0
COUGH: 1
NAUSEA: 0
SHORTNESS OF BREATH: 1
VOMITING: 0
WHEEZING: 1

## 2023-02-17 NOTE — TELEPHONE ENCOUNTER
Please call and schedule patient in 4 weeks for cholesterol, blood pressure, lab results. Thank you!

## 2023-02-17 NOTE — PROGRESS NOTES
Yarelis Hale presents today for   Chief Complaint   Patient presents with    Cough     Dark Yellow sputum. Patient states that sometimes the mucus gets stuck. Patient states that he is wheezing as well. He denies chest pain but patient is stating that he is having shortness of breath. He states that he gets tired real fast.                Depression Screening:  PHQ-9 Questionaire 2/17/2023 1/25/2023 11/23/2022 10/26/2022 9/28/2022 9/21/2022   Little interest or pleasure in doing things 0 0 1 3 0 0   Feeling down, depressed, or hopeless 0 1 1 1 2 0   Trouble falling or staying asleep, or sleeping too much 0 1 3 3 0 -   Feeling tired or having little energy 0 1 2 3 3 -   Poor appetite or overeating 0 1 1 1 1 -   Feeling bad about yourself - or that you are a failure or have let yourself or your family down 0 0 0 3 2 -   Trouble concentrating on things, such as reading the newspaper or watching television 0 2 2 3 2 -   Moving or speaking so slowly that other people could have noticed. Or the opposite - being so fidgety or restless that you have been moving around a lot more than usual 0 0 0 1 2 -   Thoughts that you would be better off dead, or of hurting yourself in some way 0 - - - - -   PHQ-9 Total Score 0 6 10 18 12 0   If you checked off any problems, how difficult have these problems made it for you to do your work, take care of things at home, or get along with other people? 0 - - - - -        SHANAE 7-Anxiety   No flowsheet data found. Learning Assessment:  No question data found. Fall Risk  No flowsheet data found. Travel Screening:    Travel Screening       Question Response    In the last 10 days, have you been in contact with someone who was confirmed or suspected to have Coronavirus/COVID-19? No / Unsure    Have you had a COVID-19 viral test in the last 10 days? No    Do you have any of the following new or worsening symptoms?  None of these    Have you traveled internationally or domestically in the last month? No          Travel History   Travel since 01/17/23    No documented travel since 01/17/23          Health Maintenance reviewed and discussed and ordered per Provider. Social Determinants of Health     Tobacco Use: High Risk    Smoking Tobacco Use: Every Day    Smokeless Tobacco Use: Never    Passive Exposure: Not on file   Alcohol Use: Not on file   Financial Resource Strain: Low Risk     Difficulty of Paying Living Expenses: Not hard at all   Food Insecurity: No Food Insecurity    Worried About [employer] in the Last Year: Never true    Ran Out of Food in the Last Year: Never true   Transportation Needs: Unknown    Lack of Transportation (Medical): Not on file    Lack of Transportation (Non-Medical): No   Physical Activity: Not on file   Stress: Not on file   Social Connections: Not on file   Intimate Partner Violence: Not on file   Depression: Not at risk    PHQ-2 Score: 0   Housing Stability: Unknown    Unable to Pay for Housing in the Last Year: Not on file    Number of Places Lived in the Last Year: Not on file    Unstable Housing in the Last Year: No        Health Maintenance Due   Topic Date Due    HIV screen  Never done    Hepatitis C screen  Never done    DTaP/Tdap/Td vaccine (1 - Tdap) Never done    Colorectal Cancer Screen  Never done    Shingles vaccine (1 of 2) Never done    Low dose CT lung screening  Never done    COVID-19 Vaccine (2 - Moderna series) 09/02/2021    Flu vaccine (1) Never done   . Coordination of Care:  1. Have you been to the ER, urgent care clinic since your last visit? Hospitalized since your last visit? no    2. Have you seen or consulted any other health care providers outside of the [address] [place] [address] since your last visit? Include any pap smears or colon screening.  no

## 2023-02-17 NOTE — PROGRESS NOTES
Zulma Gilliland is a 47 y.o. male who was seen by synchronous (real-time) audio-video technology on 2/17/2023 for Cough (Dark Yellow sputum. Patient states that sometimes the mucus gets stuck. Patient states that he is wheezing as well. He denies chest pain but patient is stating that he is having shortness of breath. He states that he gets tired real fast. )    Assessment & Plan:     1. Acute cough  Comments:  Persists, likely has obstructive disease, consult pulmonary  Restart Zpak and Prednisone, continue albuterol PRN  Orders:  -     Theresa Viera MD, Pulmonology, [place] ([facility])  -     predniSONE (DELTASONE) 20 MG tablet; Take 1 tablet by mouth 2 times daily for 5 days, Disp-10 tablet, R-0Normal  -     azithromycin (ZITHROMAX) 250 MG tablet; Take 1 tablet by mouth See Admin Instructions for 5 days 500mg on day 1 followed by 250mg on days 2 - 5, Disp-6 tablet, R-0Normal  2. Cigarette nicotine dependence without complication  Assessment & Plan:  Advised patient to quit and offered support. Orders:  -     Theresa Viera MD, Pulmonology, [facility])  3. Essential hypertension  Assessment & Plan:  BP goal <130/80, continue regimen  Orders:  -     amLODIPine (NORVASC) 5 MG tablet; Take 1 tablet by mouth daily, Disp-90 tablet, R-0Normal  4. Screen for colon cancer  Comments:  Scheduled on 03/09/2023 once cleared by pulmonary     Follow-up and Dispositions    Return in about 4 weeks (around 3/17/2023) for blood pressure, cholesterol, depression, lab results (CMP, lipid).        SUBJECTIVE:     HPI    Cough -  Onset: 1 month ago  Cough is productive with dark yellow sputum  Also has wheezing, sob  Current smoker, cut back to 1 pack every 2-3 days  Not currently seeing a pulmonologist     Hypertension-  Symptoms:   None  BP readings at home are not being taken  Comorbid: HLD  Current treatment: amlodipine 5 mg    Colonoscopy - scheduled on 03/09/2023 but needs to be seen by pulmonary before procedure    Review of Systems   Constitutional: Negative. Negative for chills and fever. HENT:  Negative for sore throat. Respiratory:  Positive for cough, shortness of breath and wheezing. Cardiovascular:  Negative for chest pain, palpitations and leg swelling. Gastrointestinal:  Negative for diarrhea, nausea and vomiting. Neurological:  Negative for dizziness and headaches. OBJECTIVE:     Physical Exam     Constitutional: Stable-appearing, in no distress, alert and oriented  HENT:   Head: Normocephalic and atraumatic. Ears:  Hearing grossly intact. Mouth:  No visible perioral lesions, cyanosis, or lip swelling. Pulmonary/Chest: Does not appear dyspneic, no audible wheezes or nasal flaring. Musculoskeletal: Grossly normal active ROM in upper extremities. Neurological:  Intact recent memory, no facial or eyelid drooping, no speech impairment, answering questions appropriately. Psychiatric: Judgment and insight good, normal mood and affect. Esperanza Phalen, was evaluated through a synchronous (real-time) audio-video encounter. The patient (or guardian if applicable) is aware that this is a billable service, which includes applicable co-pays. This Virtual Visit was conducted with patient's (and/or legal guardian's) consent. The visit was conducted pursuant to the emergency declaration under the [address] [place] [address] authority and the [employer] Act. Patient identification was verified, and a caregiver was present when appropriate.    The patient was located at Home: [address] [place] [id number]  Provider was located at [facility] (Appt Dept): [address] [address]  [place]

## 2023-03-17 ENCOUNTER — CLINICAL DOCUMENTATION (OUTPATIENT)
Age: 53
End: 2023-03-17

## 2023-03-24 PROBLEM — R05.3 CHRONIC COUGH: Status: ACTIVE | Noted: 2023-03-24

## 2023-05-09 ENCOUNTER — PROCEDURE VISIT (OUTPATIENT)
Age: 53
End: 2023-05-09
Payer: MEDICAID

## 2023-05-09 ENCOUNTER — OFFICE VISIT (OUTPATIENT)
Age: 53
End: 2023-05-09
Payer: MEDICAID

## 2023-05-09 VITALS
TEMPERATURE: 97.6 F | HEART RATE: 77 BPM | WEIGHT: 169 LBS | HEIGHT: 69 IN | OXYGEN SATURATION: 97 % | DIASTOLIC BLOOD PRESSURE: 66 MMHG | BODY MASS INDEX: 25.03 KG/M2 | RESPIRATION RATE: 20 BRPM | SYSTOLIC BLOOD PRESSURE: 90 MMHG

## 2023-05-09 DIAGNOSIS — F17.210 CIGARETTE NICOTINE DEPENDENCE WITHOUT COMPLICATION: ICD-10-CM

## 2023-05-09 DIAGNOSIS — Z87.891 PERSONAL HISTORY OF TOBACCO USE: ICD-10-CM

## 2023-05-09 DIAGNOSIS — Z99.89 OSA ON CPAP: ICD-10-CM

## 2023-05-09 DIAGNOSIS — R05.3 CHRONIC COUGH: Primary | ICD-10-CM

## 2023-05-09 DIAGNOSIS — G47.33 OSA ON CPAP: ICD-10-CM

## 2023-05-09 PROCEDURE — 3074F SYST BP LT 130 MM HG: CPT | Performed by: INTERNAL MEDICINE

## 2023-05-09 PROCEDURE — 3078F DIAST BP <80 MM HG: CPT | Performed by: INTERNAL MEDICINE

## 2023-05-09 PROCEDURE — 94060 EVALUATION OF WHEEZING: CPT | Performed by: INTERNAL MEDICINE

## 2023-05-09 PROCEDURE — 99204 OFFICE O/P NEW MOD 45 MIN: CPT | Performed by: INTERNAL MEDICINE

## 2023-05-09 RX ORDER — ATORVASTATIN CALCIUM 10 MG/1
TABLET, FILM COATED ORAL
Qty: 30 TABLET | Refills: 0 | Status: SHIPPED | OUTPATIENT
Start: 2023-05-09

## 2023-05-09 RX ORDER — FLUTICASONE PROPIONATE 50 MCG
1 SPRAY, SUSPENSION (ML) NASAL DAILY
Qty: 16 G | Refills: 2 | Status: SHIPPED | OUTPATIENT
Start: 2023-05-09

## 2023-05-09 RX ORDER — MELOXICAM 7.5 MG/1
TABLET ORAL
Qty: 30 TABLET | Refills: 0 | Status: SHIPPED | OUTPATIENT
Start: 2023-05-09

## 2023-05-09 NOTE — TELEPHONE ENCOUNTER
Refill request for Meloxicam and Atorvastatin    Last appt: 2/17/23  Next appt: 5/23/23  Last lab: 10/20/22    Called pt to schedule next appt since he did not have one. Pt informed to have labs drawn sometime next week to review during next appt.      Please add lab orders

## 2023-05-09 NOTE — PROGRESS NOTES
Chloe Zhang presents today for   Chief Complaint   Patient presents with    Cough     Chronic    Follow-up     Spirometry        Is someone accompanying this pt? No    Is the patient using any DME equipment during OV? No    -DME Company NA    Depression Screening:    PHQ-9 Questionaire 2/17/2023   Little interest or pleasure in doing things 0   Feeling down, depressed, or hopeless 0   Trouble falling or staying asleep, or sleeping too much 0   Feeling tired or having little energy 0   Poor appetite or overeating 0   Feeling bad about yourself - or that you are a failure or have let yourself or your family down 0   Trouble concentrating on things, such as reading the newspaper or watching television 0   Moving or speaking so slowly that other people could have noticed. Or the opposite - being so fidgety or restless that you have been moving around a lot more than usual 0   Thoughts that you would be better off dead, or of hurting yourself in some way 0   PHQ-9 Total Score 0   If you checked off any problems, how difficult have these problems made it for you to do your work, take care of things at home, or get along with other people? 0       Learning Needs Questionnaire:     No question data found. Fall Risk:     No flowsheet data found. Abuse Screening:     No flowsheet data found. Coordination of Care:    1. Have you been to the ER, urgent care clinic since your last visit? Hospitalized since your last visit? No    2. Have you seen or consulted any other health care providers outside of the [address] [place] [address] since your last visit? Include any pap smears or colon screening. No    Medication list has been update per patient.

## 2023-05-09 NOTE — PROGRESS NOTES
[facility]  Pulmonary, Critical Care, and Sleep Medicine      Pulmonary Office Initial referral report    Name: Jovan Gutierrez     : 1970     Date: 2023        Subjective:   Patient has been referred for evaluation of: Chronic persistent cough    Patient is a 47 y.o. male who has been a cigarette smoker since age 23-half to 1 pack of cigarette per day for 30 years and states that he was well until 2021 when he was diagnosed with COVID and ever since then has not felt well. He describes symptoms of persistent dry cough which has been worse over the past 5 months-feels as though he is rattling in his chest with multiple expectorate mucus. He was recently prescribed albuterol inhaler which has been using but not finding it helpful. He states stuffy and congested in his nose. He states that he had been diagnosed with sleep apnea and was prescribed a CPAP machine about a year ago but recently had to return the machine due to change in insurance-now on disability. Currently does not have machine and therefore not using it. In addition to above patient complains of weight loss and also intermittent symptoms of night sweats.   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

## 2023-05-15 RX ORDER — FLUTICASONE PROPIONATE 50 MCG
1 SPRAY, SUSPENSION (ML) NASAL DAILY
Qty: 16 G | Refills: 2 | Status: SHIPPED | OUTPATIENT
Start: 2023-05-15

## 2023-05-15 NOTE — TELEPHONE ENCOUNTER
Pt would like his inhaler to go to [facility] on [facility]. A new script has to be place because the first one went to the wrong [facility].  For further information please call [phone]

## 2023-05-22 ASSESSMENT — ENCOUNTER SYMPTOMS: SHORTNESS OF BREATH: 0

## 2023-05-22 NOTE — PROGRESS NOTES
Chief Complaint   Patient presents with    Hypertension     Patient states that he just started back taking all medications this week. Cholesterol Problem    Depression     Patient states that he feels that he needs a increase. Urinary Frequency     Patient stats that he has an urge to use the bathroom after he already used it. Foot Pain     Patient states that his right foot, he feels tingling and numbness. He states that while walking the numbness occurred. Other     Patient states that he been having a really hard time and he is back to using cocaine. He states that now he is craving it and he want the craving to stop. Patient stats that he relapse for a whole month. Patient states that all the issues started after losing his phone over a month ago. Memory Loss     Patient states that he is starting to be forgetful. Assessment & Plan:     1. Cocaine abuse [facility])  Assessment & Plan:  Given contact information for [facility] in [place], encouraged to call and make an appointment  2. Lower urinary tract symptoms (LUTS)  Assessment & Plan:  Worsening, consult urology  Start Flomax in the interim, discussed potential s/e  Orders:  -     tamsulosin (FLOMAX) 0.4 MG capsule; Take 1 capsule by mouth daily, Disp-90 capsule, R-0Normal  -     Ambulatory referral to Urology  -     Urinalysis with Reflex to Culture; Future  3. Urinary frequency  Comments: Add A1c to set of labs, check UA  Orders:  -     Comprehensive Metabolic Panel; Future  -     Hemoglobin A1C; Future  -     Urinalysis with Reflex to Culture; Future  4. Bilateral foot pain  Comments: Worsening, consult podiatry  Orders:  -     Amb External Referral To Podiatry  5. Memory loss  Comments:  Persists, consult [facility]  Orders:  -     External Referral To Neuropsychology  6. Essential hypertension  Assessment & Plan:  BP acceptable, goal <130/80  Continue amlodipine 5 mg  Orders:  -     Comprehensive Metabolic Panel; Future  7.

## 2023-05-23 ENCOUNTER — OFFICE VISIT (OUTPATIENT)
Facility: CLINIC | Age: 53
End: 2023-05-23
Payer: MEDICAID

## 2023-05-23 VITALS
HEART RATE: 89 BPM | BODY MASS INDEX: 24.59 KG/M2 | DIASTOLIC BLOOD PRESSURE: 81 MMHG | SYSTOLIC BLOOD PRESSURE: 128 MMHG | TEMPERATURE: 97.9 F | OXYGEN SATURATION: 99 % | HEIGHT: 69 IN | RESPIRATION RATE: 16 BRPM | WEIGHT: 166 LBS

## 2023-05-23 DIAGNOSIS — E78.5 HYPERLIPIDEMIA LDL GOAL <100: ICD-10-CM

## 2023-05-23 DIAGNOSIS — R35.0 URINARY FREQUENCY: ICD-10-CM

## 2023-05-23 DIAGNOSIS — F32.1 MODERATE MAJOR DEPRESSION (HCC): ICD-10-CM

## 2023-05-23 DIAGNOSIS — Z11.59 NEED FOR HEPATITIS C SCREENING TEST: ICD-10-CM

## 2023-05-23 DIAGNOSIS — Z11.4 ENCOUNTER FOR SCREENING FOR HIV: ICD-10-CM

## 2023-05-23 DIAGNOSIS — I10 ESSENTIAL HYPERTENSION: ICD-10-CM

## 2023-05-23 DIAGNOSIS — F14.10 COCAINE ABUSE (HCC): Primary | ICD-10-CM

## 2023-05-23 DIAGNOSIS — M79.672 BILATERAL FOOT PAIN: ICD-10-CM

## 2023-05-23 DIAGNOSIS — R41.3 MEMORY LOSS: ICD-10-CM

## 2023-05-23 DIAGNOSIS — Z12.11 SCREEN FOR COLON CANCER: ICD-10-CM

## 2023-05-23 DIAGNOSIS — R39.9 LOWER URINARY TRACT SYMPTOMS (LUTS): ICD-10-CM

## 2023-05-23 DIAGNOSIS — M79.671 BILATERAL FOOT PAIN: ICD-10-CM

## 2023-05-23 PROCEDURE — 3074F SYST BP LT 130 MM HG: CPT | Performed by: NURSE PRACTITIONER

## 2023-05-23 PROCEDURE — 99215 OFFICE O/P EST HI 40 MIN: CPT | Performed by: NURSE PRACTITIONER

## 2023-05-23 PROCEDURE — 3079F DIAST BP 80-89 MM HG: CPT | Performed by: NURSE PRACTITIONER

## 2023-05-23 RX ORDER — TAMSULOSIN HYDROCHLORIDE 0.4 MG/1
0.4 CAPSULE ORAL DAILY
Qty: 90 CAPSULE | Refills: 0 | Status: SHIPPED | OUTPATIENT
Start: 2023-05-23

## 2023-05-23 SDOH — ECONOMIC STABILITY: INCOME INSECURITY: HOW HARD IS IT FOR YOU TO PAY FOR THE VERY BASICS LIKE FOOD, HOUSING, MEDICAL CARE, AND HEATING?: NOT HARD AT ALL

## 2023-05-23 SDOH — ECONOMIC STABILITY: FOOD INSECURITY: WITHIN THE PAST 12 MONTHS, THE FOOD YOU BOUGHT JUST DIDN'T LAST AND YOU DIDN'T HAVE MONEY TO GET MORE.: NEVER TRUE

## 2023-05-23 SDOH — ECONOMIC STABILITY: FOOD INSECURITY: WITHIN THE PAST 12 MONTHS, YOU WORRIED THAT YOUR FOOD WOULD RUN OUT BEFORE YOU GOT MONEY TO BUY MORE.: NEVER TRUE

## 2023-05-23 ASSESSMENT — PATIENT HEALTH QUESTIONNAIRE - PHQ9
1. LITTLE INTEREST OR PLEASURE IN DOING THINGS: 2
SUM OF ALL RESPONSES TO PHQ QUESTIONS 1-9: 12
SUM OF ALL RESPONSES TO PHQ QUESTIONS 1-9: 12
10. IF YOU CHECKED OFF ANY PROBLEMS, HOW DIFFICULT HAVE THESE PROBLEMS MADE IT FOR YOU TO DO YOUR WORK, TAKE CARE OF THINGS AT HOME, OR GET ALONG WITH OTHER PEOPLE: 1
9. THOUGHTS THAT YOU WOULD BE BETTER OFF DEAD, OR OF HURTING YOURSELF: 0
SUM OF ALL RESPONSES TO PHQ QUESTIONS 1-9: 12
3. TROUBLE FALLING OR STAYING ASLEEP: 0
7. TROUBLE CONCENTRATING ON THINGS, SUCH AS READING THE NEWSPAPER OR WATCHING TELEVISION: 3
5. POOR APPETITE OR OVEREATING: 2
8. MOVING OR SPEAKING SO SLOWLY THAT OTHER PEOPLE COULD HAVE NOTICED. OR THE OPPOSITE, BEING SO FIGETY OR RESTLESS THAT YOU HAVE BEEN MOVING AROUND A LOT MORE THAN USUAL: 1
4. FEELING TIRED OR HAVING LITTLE ENERGY: 2
SUM OF ALL RESPONSES TO PHQ QUESTIONS 1-9: 12
2. FEELING DOWN, DEPRESSED OR HOPELESS: 1
6. FEELING BAD ABOUT YOURSELF - OR THAT YOU ARE A FAILURE OR HAVE LET YOURSELF OR YOUR FAMILY DOWN: 1
SUM OF ALL RESPONSES TO PHQ9 QUESTIONS 1 & 2: 3

## 2023-05-23 NOTE — ASSESSMENT & PLAN NOTE
Recurrent, noted restart of Fluoxetine last week, continue at 10 mg for now and re-evaluate in 2 mos

## 2023-05-23 NOTE — PROGRESS NOTES
Red Burleson presents today for   Chief Complaint   Patient presents with    Hypertension     Patient states that he just started back taking all medications this week. Cholesterol Problem    Depression     Patient states that he feels that he needs a increase. Urinary Frequency     Patient stats that he has an urge to use the bathroom after he already used it. Foot Pain     Patient states that his right foot, he feels tingling and numbness. He states that while walking the numbness occurred. Other     Patient states that he been having a really hard time and he is back to using cocaine. He states that now he is craving it and he want the craving to stop. Patient stats that he relapse for a whole month. Patient states that all the issues started after losing his phone over a month ago. Memory Loss     Patient states that he is starting to be forgetful. Is someone accompanying this pt? no    Is the patient using any DME equipment during [address]? no    Depression Screening:  PHQ-9 Questionaire 5/23/2023 2/17/2023 1/25/2023 11/23/2022 10/26/2022 9/28/2022 9/21/2022   Little interest or pleasure in doing things 2 0 0 1 3 0 0   Feeling down, depressed, or hopeless 1 0 1 1 1 2 0   Trouble falling or staying asleep, or sleeping too much 0 0 1 3 3 0 -   Feeling tired or having little energy 2 0 1 2 3 3 -   Poor appetite or overeating 2 0 1 1 1 1 -   Feeling bad about yourself - or that you are a failure or have let yourself or your family down 1 0 0 0 3 2 -   Trouble concentrating on things, such as reading the newspaper or watching television 3 0 2 2 3 2 -   Moving or speaking so slowly that other people could have noticed.  Or the opposite - being so fidgety or restless that you have been moving around a lot more than usual 1 0 0 0 1 2 -   Thoughts that you would be better off dead, or of hurting yourself in some way 0 0 - - - - -   PHQ-9 Total Score 12 0 6 10 18 12 0   If you checked off any

## 2023-05-23 NOTE — PATIENT INSTRUCTIONS
Please call [facility] for help with your substance abuse. They are located at [address] [place] [address]. Phone number [phone].

## 2023-06-01 ENCOUNTER — TELEPHONE (OUTPATIENT)
Facility: CLINIC | Age: 53
End: 2023-06-01

## 2023-06-01 DIAGNOSIS — R41.3 MEMORY LOSS: Primary | ICD-10-CM

## 2023-06-01 NOTE — TELEPHONE ENCOUNTER
Referral to Dr. South Fernandez from [facility] neuropsych was denied, stating patient needs to see neurology first.  Will cancel referral and place one for Dr. Allen Notice.

## 2023-07-10 DIAGNOSIS — I10 ESSENTIAL HYPERTENSION: ICD-10-CM

## 2023-07-10 RX ORDER — MELOXICAM 7.5 MG/1
TABLET ORAL
Qty: 30 TABLET | Refills: 0 | OUTPATIENT
Start: 2023-07-10

## 2023-07-10 RX ORDER — ATORVASTATIN CALCIUM 10 MG/1
TABLET, FILM COATED ORAL
Qty: 30 TABLET | Refills: 0 | OUTPATIENT
Start: 2023-07-10

## 2023-07-10 RX ORDER — AMLODIPINE BESYLATE 5 MG/1
TABLET ORAL
Qty: 90 TABLET | Refills: 0 | OUTPATIENT
Start: 2023-07-10

## 2023-07-17 ENCOUNTER — HOSPITAL ENCOUNTER (OUTPATIENT)
Facility: HOSPITAL | Age: 53
Discharge: HOME | End: 2023-07-20

## 2023-07-17 ENCOUNTER — HOSPITAL ENCOUNTER (OUTPATIENT)
Facility: HOSPITAL | Age: 53
Discharge: HOME | End: 2023-07-20
Attending: INTERNAL MEDICINE
Payer: MEDICAID

## 2023-07-17 DIAGNOSIS — Z87.891 PERSONAL HISTORY OF TOBACCO USE: ICD-10-CM

## 2023-07-17 LAB — SENTARA SPECIMEN COLLECTION: NORMAL

## 2023-07-17 PROCEDURE — 71271 CT THORAX LUNG CANCER SCR C-: CPT

## 2023-07-19 PROBLEM — R73.03 PREDIABETES: Status: ACTIVE | Noted: 2023-07-19

## 2023-07-19 LAB
ALBUMIN SERPL-MCNC: 4.7 G/DL (ref 3.8–4.9)
ALBUMIN/GLOB SERPL: 2.2 {RATIO} (ref 1.2–2.2)
ALP SERPL-CCNC: 92 IU/L (ref 44–121)
ALT SERPL-CCNC: 12 IU/L (ref 0–44)
AST SERPL-CCNC: 17 IU/L (ref 0–40)
BASOPHILS # BLD AUTO: 0 X10E3/UL (ref 0–0.2)
BASOPHILS NFR BLD AUTO: 1 %
BILIRUB SERPL-MCNC: 0.2 MG/DL (ref 0–1.2)
BUN SERPL-MCNC: 14 MG/DL (ref 6–24)
BUN/CREAT SERPL: 14 (ref 9–20)
CALCIUM SERPL-MCNC: 9.6 MG/DL (ref 8.7–10.2)
CHLORIDE SERPL-SCNC: 106 MMOL/L (ref 96–106)
CHOLEST SERPL-MCNC: 178 MG/DL (ref 100–199)
CO2 SERPL-SCNC: 17 MMOL/L (ref 20–29)
CREAT SERPL-MCNC: 1.03 MG/DL (ref 0.76–1.27)
EGFRCR SERPLBLD CKD-EPI 2021: 86 ML/MIN/1.73
EOSINOPHIL # BLD AUTO: 0.1 X10E3/UL (ref 0–0.4)
EOSINOPHIL NFR BLD AUTO: 2 %
ERYTHROCYTE [DISTWIDTH] IN BLOOD BY AUTOMATED COUNT: 13.6 % (ref 11.6–15.4)
GLOBULIN SER CALC-MCNC: 2.1 G/DL (ref 1.5–4.5)
GLUCOSE SERPL-MCNC: 94 MG/DL (ref 70–99)
HBA1C MFR BLD: 5.7 % (ref 4.8–5.6)
HCT VFR BLD AUTO: 40.4 % (ref 37.5–51)
HCV IGG SERPL QL IA: NON REACTIVE
HDLC SERPL-MCNC: 43 MG/DL
HGB BLD-MCNC: 13.3 G/DL (ref 13–17.7)
HIV 1+2 AB+HIV1 P24 AG SERPL QL IA: NON REACTIVE
IMM GRANULOCYTES # BLD AUTO: 0 X10E3/UL (ref 0–0.1)
IMM GRANULOCYTES NFR BLD AUTO: 0 %
LDLC SERPL CALC-MCNC: 116 MG/DL (ref 0–99)
LYMPHOCYTES # BLD AUTO: 1.5 X10E3/UL (ref 0.7–3.1)
LYMPHOCYTES NFR BLD AUTO: 32 %
MCH RBC QN AUTO: 31.4 PG (ref 26.6–33)
MCHC RBC AUTO-ENTMCNC: 32.9 G/DL (ref 31.5–35.7)
MCV RBC AUTO: 96 FL (ref 79–97)
MONOCYTES # BLD AUTO: 0.4 X10E3/UL (ref 0.1–0.9)
MONOCYTES NFR BLD AUTO: 8 %
NEUTROPHILS # BLD AUTO: 2.6 X10E3/UL (ref 1.4–7)
NEUTROPHILS NFR BLD AUTO: 57 %
PLATELET # BLD AUTO: 267 X10E3/UL (ref 150–450)
POTASSIUM SERPL-SCNC: 4.4 MMOL/L (ref 3.5–5.2)
PROT SERPL-MCNC: 6.8 G/DL (ref 6–8.5)
RBC # BLD AUTO: 4.23 X10E6/UL (ref 4.14–5.8)
SODIUM SERPL-SCNC: 143 MMOL/L (ref 134–144)
TRIGL SERPL-MCNC: 103 MG/DL (ref 0–149)
VLDLC SERPL CALC-MCNC: 19 MG/DL (ref 5–40)
WBC # BLD AUTO: 4.5 X10E3/UL (ref 3.4–10.8)

## 2023-07-19 ASSESSMENT — ENCOUNTER SYMPTOMS: SHORTNESS OF BREATH: 0

## 2023-07-19 NOTE — PROGRESS NOTES
Chief Complaint   Patient presents with    Blood Sugar Problem    Cholesterol Problem    Hypertension    Depression    Sebaceous cyst     Assessment & Plan:     1. Prediabetes  Assessment & Plan:  New-onset, lifestyle modifications advised  Recheck A1c in 6 mos  Orders:  -     Comprehensive Metabolic Panel; Future  -     Hemoglobin A1C; Future  2. Hyperlipidemia LDL goal <100  Assessment & Plan:  LDL at goal, continue atorvastatin 10 mg QHS  Recheck lipid panel in 6 mos  Orders:  -     Comprehensive Metabolic Panel; Future  -     Lipid Panel; Future  -     atorvastatin (LIPITOR) 10 MG tablet; TAKE 1 TABLET BY MOUTH NIGHTLY FOR  EXCESSIVE  FAT  IN  THE  BLOOD, Disp-90 tablet, R-1Normal  3. Essential hypertension  Assessment & Plan:  BP acceptable, goal <130/80  Continue amlodipine 5 mg daily  Orders:  -     CBC with Auto Differential; Future  -     Comprehensive Metabolic Panel; Future  -     amLODIPine (NORVASC) 5 MG tablet; Take 1 tablet by mouth daily, Disp-90 tablet, R-0Normal  4. Chest pain, unspecified type  Comments:  No further recurrence but wants to see cardiology  Referral renewed, given contact info to schedule  Orders:  -     Ambulatory referral to Cardiology  -     CBC with Auto Differential; Future  -     Comprehensive Metabolic Panel; Future  5. Moderate major depression (HCC)  Assessment & Plan:  Worsening, start Flouxetine at 20 mg daily, sent to pharmacy  Orders:  -     CBC with Auto Differential; Future  -     Comprehensive Metabolic Panel; Future  -     FLUoxetine (PROZAC) 20 MG capsule; Take 1 capsule by mouth daily, Disp-90 capsule, R-0Normal  6. Sebaceous cyst  -     Amb External Referral To Dermatology  7. Lower urinary tract symptoms (LUTS)  Assessment & Plan:  Continue Flomax until seen by urology, referred  Orders:  -     tamsulosin (FLOMAX) 0.4 MG capsule; Take 1 capsule by mouth daily, Disp-90 capsule, R-1Normal  -     [facility]  8.  Non-seasonal allergic rhinitis due

## 2023-07-21 ENCOUNTER — OFFICE VISIT (OUTPATIENT)
Facility: CLINIC | Age: 53
End: 2023-07-21
Payer: MEDICARE

## 2023-07-21 VITALS
HEART RATE: 85 BPM | DIASTOLIC BLOOD PRESSURE: 84 MMHG | TEMPERATURE: 98.2 F | BODY MASS INDEX: 24.66 KG/M2 | WEIGHT: 167 LBS | SYSTOLIC BLOOD PRESSURE: 121 MMHG | OXYGEN SATURATION: 100 %

## 2023-07-21 DIAGNOSIS — J30.1 NON-SEASONAL ALLERGIC RHINITIS DUE TO POLLEN: ICD-10-CM

## 2023-07-21 DIAGNOSIS — L72.3 SEBACEOUS CYST: ICD-10-CM

## 2023-07-21 DIAGNOSIS — R39.9 LOWER URINARY TRACT SYMPTOMS (LUTS): ICD-10-CM

## 2023-07-21 DIAGNOSIS — I10 ESSENTIAL HYPERTENSION: ICD-10-CM

## 2023-07-21 DIAGNOSIS — Z71.2 ENCOUNTER TO DISCUSS TEST RESULTS: ICD-10-CM

## 2023-07-21 DIAGNOSIS — E78.5 HYPERLIPIDEMIA LDL GOAL <100: ICD-10-CM

## 2023-07-21 DIAGNOSIS — F32.1 MODERATE MAJOR DEPRESSION (HCC): ICD-10-CM

## 2023-07-21 DIAGNOSIS — Z23 NEED FOR PROPHYLACTIC VACCINATION AGAINST DIPHTHERIA-TETANUS-PERTUSSIS (DTP): ICD-10-CM

## 2023-07-21 DIAGNOSIS — R73.03 PREDIABETES: Primary | ICD-10-CM

## 2023-07-21 DIAGNOSIS — R07.9 CHEST PAIN, UNSPECIFIED TYPE: ICD-10-CM

## 2023-07-21 PROCEDURE — 99215 OFFICE O/P EST HI 40 MIN: CPT | Performed by: NURSE PRACTITIONER

## 2023-07-21 PROCEDURE — 3079F DIAST BP 80-89 MM HG: CPT | Performed by: NURSE PRACTITIONER

## 2023-07-21 PROCEDURE — 3074F SYST BP LT 130 MM HG: CPT | Performed by: NURSE PRACTITIONER

## 2023-07-21 PROCEDURE — 90715 TDAP VACCINE 7 YRS/> IM: CPT | Performed by: NURSE PRACTITIONER

## 2023-07-21 PROCEDURE — 90471 IMMUNIZATION ADMIN: CPT | Performed by: NURSE PRACTITIONER

## 2023-07-21 RX ORDER — ATORVASTATIN CALCIUM 10 MG/1
TABLET, FILM COATED ORAL
Qty: 90 TABLET | Refills: 1 | Status: SHIPPED | OUTPATIENT
Start: 2023-07-21

## 2023-07-21 RX ORDER — TAMSULOSIN HYDROCHLORIDE 0.4 MG/1
0.4 CAPSULE ORAL DAILY
Qty: 90 CAPSULE | Refills: 1 | Status: SHIPPED | OUTPATIENT
Start: 2023-07-21

## 2023-07-21 RX ORDER — FLUTICASONE PROPIONATE 50 MCG
1 SPRAY, SUSPENSION (ML) NASAL DAILY
Qty: 16 G | Refills: 2 | Status: SHIPPED | OUTPATIENT
Start: 2023-07-21

## 2023-07-21 RX ORDER — FLUOXETINE HYDROCHLORIDE 20 MG/1
20 CAPSULE ORAL DAILY
Qty: 90 CAPSULE | Refills: 0 | Status: SHIPPED | OUTPATIENT
Start: 2023-07-21

## 2023-07-21 RX ORDER — AMLODIPINE BESYLATE 5 MG/1
5 TABLET ORAL DAILY
Qty: 90 TABLET | Refills: 0 | Status: SHIPPED | OUTPATIENT
Start: 2023-07-21

## 2023-07-21 ASSESSMENT — PATIENT HEALTH QUESTIONNAIRE - PHQ9
8. MOVING OR SPEAKING SO SLOWLY THAT OTHER PEOPLE COULD HAVE NOTICED. OR THE OPPOSITE, BEING SO FIGETY OR RESTLESS THAT YOU HAVE BEEN MOVING AROUND A LOT MORE THAN USUAL: 2
SUM OF ALL RESPONSES TO PHQ9 QUESTIONS 1 & 2: 3
SUM OF ALL RESPONSES TO PHQ QUESTIONS 1-9: 16
9. THOUGHTS THAT YOU WOULD BE BETTER OFF DEAD, OR OF HURTING YOURSELF: 0
5. POOR APPETITE OR OVEREATING: 1
1. LITTLE INTEREST OR PLEASURE IN DOING THINGS: 1
7. TROUBLE CONCENTRATING ON THINGS, SUCH AS READING THE NEWSPAPER OR WATCHING TELEVISION: 3
6. FEELING BAD ABOUT YOURSELF - OR THAT YOU ARE A FAILURE OR HAVE LET YOURSELF OR YOUR FAMILY DOWN: 2
SUM OF ALL RESPONSES TO PHQ QUESTIONS 1-9: 16
10. IF YOU CHECKED OFF ANY PROBLEMS, HOW DIFFICULT HAVE THESE PROBLEMS MADE IT FOR YOU TO DO YOUR WORK, TAKE CARE OF THINGS AT HOME, OR GET ALONG WITH OTHER PEOPLE: VERY DIFFICULT
4. FEELING TIRED OR HAVING LITTLE ENERGY: 3
2. FEELING DOWN, DEPRESSED OR HOPELESS: 2
3. TROUBLE FALLING OR STAYING ASLEEP: 2

## 2023-07-21 ASSESSMENT — PATIENT HEALTH QUESTIONNAIRE - GENERAL
HAVE YOU EVER, IN YOUR WHOLE LIFE, TRIED TO KILL YOURSELF OR MADE A SUICIDE ATTEMPT?: NO
HAS THERE BEEN A TIME IN THE PAST MONTH WHEN YOU HAVE HAD SERIOUS THOUGHTS ABOUT ENDING YOUR LIFE?: NO

## 2023-07-21 NOTE — PATIENT INSTRUCTIONS
[facility]  [facility] [address] [facility] [address] [facility]  Phone: [phone]

## 2023-07-21 NOTE — PROGRESS NOTES
Neema Hernandez presents today for   Chief Complaint   Patient presents with    Blood Sugar Problem    Cholesterol Problem    Hypertension    Depression       Is someone accompanying this pt? NO    Is the patient using any DME equipment during OV? NO    Depression Screening:  PHQ-9 Questionaire 5/23/2023 2/17/2023 1/25/2023 11/23/2022 10/26/2022 9/28/2022 9/21/2022   Little interest or pleasure in doing things 2 0 0 1 3 0 0   Feeling down, depressed, or hopeless 1 0 1 1 1 2 0   Trouble falling or staying asleep, or sleeping too much 0 0 1 3 3 0 -   Feeling tired or having little energy 2 0 1 2 3 3 -   Poor appetite or overeating 2 0 1 1 1 1 -   Feeling bad about yourself - or that you are a failure or have let yourself or your family down 1 0 0 0 3 2 -   Trouble concentrating on things, such as reading the newspaper or watching television 3 0 2 2 3 2 -   Moving or speaking so slowly that other people could have noticed. Or the opposite - being so fidgety or restless that you have been moving around a lot more than usual 1 0 0 0 1 2 -   Thoughts that you would be better off dead, or of hurting yourself in some way 0 0 - - - - -   PHQ-9 Total Score 12 0 6 10 18 12 0   If you checked off any problems, how difficult have these problems made it for you to do your work, take care of things at home, or get along with other people? 1 0 - - - - -        SHANAE 7-Anxiety   No flowsheet data found. Learning Assessment:  No question data found. Fall Risk  No flowsheet data found. Travel Screening:    Travel Screening     No screening recorded since 07/20/23 0000       Travel History   Travel since 06/21/23    No documented travel since 06/21/23          Health Maintenance reviewed and discussed and ordered per Provider.   Social Determinants of Health     Tobacco Use: High Risk    Smoking Tobacco Use: Every Day    Smokeless Tobacco Use: Never    Passive Exposure: Current   Alcohol Use: Not on file   Financial

## 2023-07-21 NOTE — PROGRESS NOTES
Obtained consent from patient. Per verbal order from JASON Olivares Injection of Tdap administered. Verified by me and Harl Fothergill that this is the correct immunization/injection. Patient observed for 15 minutes with no adverse reaction.

## 2023-08-27 DIAGNOSIS — I10 ESSENTIAL HYPERTENSION: ICD-10-CM

## 2023-08-28 RX ORDER — AMLODIPINE BESYLATE 5 MG/1
TABLET ORAL
Qty: 90 TABLET | Refills: 1 | Status: SHIPPED | OUTPATIENT
Start: 2023-08-28

## 2023-10-09 DIAGNOSIS — R39.9 LOWER URINARY TRACT SYMPTOMS (LUTS): ICD-10-CM

## 2023-10-09 DIAGNOSIS — J30.1 NON-SEASONAL ALLERGIC RHINITIS DUE TO POLLEN: ICD-10-CM

## 2023-10-09 DIAGNOSIS — F32.1 MODERATE MAJOR DEPRESSION (HCC): ICD-10-CM

## 2023-10-09 RX ORDER — TAMSULOSIN HYDROCHLORIDE 0.4 MG/1
0.4 CAPSULE ORAL DAILY
Qty: 90 CAPSULE | Refills: 1 | Status: SHIPPED | OUTPATIENT
Start: 2023-10-09

## 2023-10-09 RX ORDER — FLUTICASONE PROPIONATE 50 MCG
1 SPRAY, SUSPENSION (ML) NASAL DAILY
Qty: 16 G | Refills: 1 | Status: SHIPPED | OUTPATIENT
Start: 2023-10-09

## 2023-10-09 RX ORDER — FLUOXETINE HYDROCHLORIDE 20 MG/1
20 CAPSULE ORAL DAILY
Qty: 90 CAPSULE | Refills: 1 | Status: SHIPPED | OUTPATIENT
Start: 2023-10-09

## 2023-10-09 NOTE — TELEPHONE ENCOUNTER
Patient is out of town and running out of generic Prozac, Flomax and Flonase. Please send on 90 day supply to [facility] in [place], [place]. Please advise patient of actions taken.     Last seen 23  Last labs 23  Next appt 10/23/23    Requested Prescriptions     Pending Prescriptions Disp Refills    FLUoxetine (PROZAC) 20 MG capsule 90 capsule 0     Sig: Take 1 capsule by mouth daily    fluticasone (FLONASE) 50 MCG/ACT nasal spray 16 g 0     Si spray by Each Nostril route daily    tamsulosin (FLOMAX) 0.4 MG capsule 90 capsule 0     Sig: Take 1 capsule by mouth daily

## 2023-10-13 ENCOUNTER — TELEPHONE (OUTPATIENT)
Facility: CLINIC | Age: 53
End: 2023-10-13

## 2023-10-13 DIAGNOSIS — R39.9 LOWER URINARY TRACT SYMPTOMS (LUTS): ICD-10-CM

## 2023-10-13 DIAGNOSIS — F32.1 MODERATE MAJOR DEPRESSION (HCC): ICD-10-CM

## 2023-10-13 RX ORDER — TAMSULOSIN HYDROCHLORIDE 0.4 MG/1
0.4 CAPSULE ORAL DAILY
Qty: 90 CAPSULE | Refills: 0 | OUTPATIENT
Start: 2023-10-13

## 2023-10-13 RX ORDER — FLUOXETINE HYDROCHLORIDE 20 MG/1
20 CAPSULE ORAL DAILY
Qty: 90 CAPSULE | Refills: 0 | OUTPATIENT
Start: 2023-10-13

## 2023-10-13 NOTE — TELEPHONE ENCOUNTER
Pt called to get a refill of FLUoxetine (PROZAC) 20 MG capsule [[id number]]   tamsulosin (FLOMAX) 0.4 MG capsule [[id number]]   Sent to the [facility] on file

## 2023-10-26 ASSESSMENT — ENCOUNTER SYMPTOMS: SHORTNESS OF BREATH: 0

## 2023-10-27 ENCOUNTER — OFFICE VISIT (OUTPATIENT)
Facility: CLINIC | Age: 53
End: 2023-10-27

## 2023-10-27 VITALS
BODY MASS INDEX: 26.22 KG/M2 | SYSTOLIC BLOOD PRESSURE: 114 MMHG | HEIGHT: 69 IN | WEIGHT: 177 LBS | HEART RATE: 74 BPM | OXYGEN SATURATION: 100 % | TEMPERATURE: 98.2 F | DIASTOLIC BLOOD PRESSURE: 71 MMHG

## 2023-10-27 DIAGNOSIS — E78.5 HYPERLIPIDEMIA LDL GOAL <100: ICD-10-CM

## 2023-10-27 DIAGNOSIS — F32.1 MODERATE MAJOR DEPRESSION (HCC): ICD-10-CM

## 2023-10-27 DIAGNOSIS — R05.3 CHRONIC COUGH: ICD-10-CM

## 2023-10-27 DIAGNOSIS — R59.1 LYMPHADENOPATHY: ICD-10-CM

## 2023-10-27 DIAGNOSIS — Z71.89 ACP (ADVANCE CARE PLANNING): ICD-10-CM

## 2023-10-27 DIAGNOSIS — R73.03 PREDIABETES: ICD-10-CM

## 2023-10-27 DIAGNOSIS — Z00.00 MEDICARE ANNUAL WELLNESS VISIT, SUBSEQUENT: Primary | ICD-10-CM

## 2023-10-27 DIAGNOSIS — R39.9 LOWER URINARY TRACT SYMPTOMS (LUTS): ICD-10-CM

## 2023-10-27 DIAGNOSIS — M54.2 NECK PAIN: ICD-10-CM

## 2023-10-27 DIAGNOSIS — I10 ESSENTIAL HYPERTENSION: ICD-10-CM

## 2023-10-27 DIAGNOSIS — J30.1 NON-SEASONAL ALLERGIC RHINITIS DUE TO POLLEN: ICD-10-CM

## 2023-10-27 DIAGNOSIS — Z23 NEEDS FLU SHOT: ICD-10-CM

## 2023-10-27 RX ORDER — FLUOXETINE HYDROCHLORIDE 20 MG/1
20 CAPSULE ORAL DAILY
Qty: 90 CAPSULE | Refills: 0 | Status: SHIPPED | OUTPATIENT
Start: 2023-10-27

## 2023-10-27 RX ORDER — AMLODIPINE BESYLATE 5 MG/1
5 TABLET ORAL DAILY
Qty: 90 TABLET | Refills: 0 | Status: SHIPPED | OUTPATIENT
Start: 2023-10-27

## 2023-10-27 RX ORDER — TAMSULOSIN HYDROCHLORIDE 0.4 MG/1
0.4 CAPSULE ORAL DAILY
Qty: 90 CAPSULE | Refills: 1 | Status: SHIPPED | OUTPATIENT
Start: 2023-10-27

## 2023-10-27 RX ORDER — FLUTICASONE PROPIONATE 50 MCG
1 SPRAY, SUSPENSION (ML) NASAL DAILY
Qty: 16 G | Refills: 1 | Status: SHIPPED | OUTPATIENT
Start: 2023-10-27

## 2023-10-27 RX ORDER — ATORVASTATIN CALCIUM 10 MG/1
TABLET, FILM COATED ORAL
Qty: 90 TABLET | Refills: 0 | Status: SHIPPED | OUTPATIENT
Start: 2023-10-27

## 2023-10-27 RX ORDER — ALBUTEROL SULFATE 90 UG/1
2 AEROSOL, METERED RESPIRATORY (INHALATION) EVERY 6 HOURS PRN
Qty: 18 G | Refills: 0 | Status: SHIPPED | OUTPATIENT
Start: 2023-10-27

## 2023-10-27 ASSESSMENT — PATIENT HEALTH QUESTIONNAIRE - PHQ9
4. FEELING TIRED OR HAVING LITTLE ENERGY: 1
7. TROUBLE CONCENTRATING ON THINGS, SUCH AS READING THE NEWSPAPER OR WATCHING TELEVISION: 2
SUM OF ALL RESPONSES TO PHQ QUESTIONS 1-9: 7
SUM OF ALL RESPONSES TO PHQ QUESTIONS 1-9: 7
1. LITTLE INTEREST OR PLEASURE IN DOING THINGS: 0
SUM OF ALL RESPONSES TO PHQ QUESTIONS 1-9: 7
3. TROUBLE FALLING OR STAYING ASLEEP: 1
SUM OF ALL RESPONSES TO PHQ QUESTIONS 1-9: 7
5. POOR APPETITE OR OVEREATING: 1
10. IF YOU CHECKED OFF ANY PROBLEMS, HOW DIFFICULT HAVE THESE PROBLEMS MADE IT FOR YOU TO DO YOUR WORK, TAKE CARE OF THINGS AT HOME, OR GET ALONG WITH OTHER PEOPLE: 0
SUM OF ALL RESPONSES TO PHQ9 QUESTIONS 1 & 2: 1
6. FEELING BAD ABOUT YOURSELF - OR THAT YOU ARE A FAILURE OR HAVE LET YOURSELF OR YOUR FAMILY DOWN: 0
2. FEELING DOWN, DEPRESSED OR HOPELESS: 1
9. THOUGHTS THAT YOU WOULD BE BETTER OFF DEAD, OR OF HURTING YOURSELF: 0
8. MOVING OR SPEAKING SO SLOWLY THAT OTHER PEOPLE COULD HAVE NOTICED. OR THE OPPOSITE, BEING SO FIGETY OR RESTLESS THAT YOU HAVE BEEN MOVING AROUND A LOT MORE THAN USUAL: 1

## 2023-10-27 ASSESSMENT — LIFESTYLE VARIABLES
HOW MANY STANDARD DRINKS CONTAINING ALCOHOL DO YOU HAVE ON A TYPICAL DAY: 3 OR 4
HOW OFTEN DO YOU HAVE A DRINK CONTAINING ALCOHOL: 2-4 TIMES A MONTH

## 2023-10-27 NOTE — PROGRESS NOTES
Advance Care Planning     Advance Care Planning (ACP) Physician/NP/PA Conversation    Date of Conversation: 10/27/2023  Conducted with: Patient with Decision Making Capacity    Healthcare Decision Maker:    Primary Decision Maker: Thuy Osullivan - Elidia - [phone]  Click here to complete Healthcare Decision Makers including selection of the Healthcare Decision Maker Relationship (ie \"Primary\"). Today we documented Decision Maker(s). The patient will provide ACP documents. Care Preferences:    Hospitalization: \"If your health worsens and it becomes clear that your chance of recovery is unlikely, what would be your preference regarding hospitalization? \"  The patient would prefer comfort-focused treatment without hospitalization. Ventilation: \"If you were unable to breath on your own and your chance of recovery was unlikely, what would be your preference about the use of a ventilator (breathing machine) if it was available to you? \"  The patient would desire the use of a ventilator. Resuscitation: \"In the event your heart stopped as a result of an underlying serious health condition, would you want attempts made to restart your heart, or would you prefer a natural death? \"  Yes, attempt to resuscitate.       Conversation Outcomes / Follow-Up Plan:  ACP in process - information provided, considering goals and options      Length of Voluntary ACP Conversation in minutes:  16 minutes    ANGE Luis
Chief Complaint   Patient presents with    Medicare AWV    Blood Sugar Problem    Cholesterol Problem    Hypertension    Depression    Neck Pain     Pt c/o neck feeling stiff that started about 1 week. Pt c/o difficulty turning head. Pt states pain has improved. Assessment & Plan:     1. Prediabetes  Assessment & Plan:  Continue lifestyle modifications  Recheck A1c as planned in 3 mos  2. Hyperlipidemia LDL goal <100  Assessment & Plan:  LDL at goal in July, continue atorvastatin 10 mg qhs  Recheck lipid panel as planned in 3 mos  Orders:  -     atorvastatin (LIPITOR) 10 MG tablet; TAKE 1 TABLET BY MOUTH NIGHTLY FOR  EXCESSIVE  FAT  IN  THE  BLOOD, Disp-90 tablet, R-0Normal  3. Essential hypertension  Assessment & Plan:  BP at goal, <130/80  Continue amlodipine 5 mg daily  Orders:  -     amLODIPine (NORVASC) 5 MG tablet; Take 1 tablet by mouth daily, Disp-90 tablet, R-0Normal  4. Moderate major depression (HCC)  Assessment & Plan:  Stable on Fluoxetine 20 mg, continue  Orders:  -     FLUoxetine (PROZAC) 20 MG capsule; Take 1 capsule by mouth daily, Disp-90 capsule, R-0Normal  5. Lower urinary tract symptoms (LUTS)  Assessment & Plan:  Continue Flomax, follow up with urology (referred in July)  Orders:  -     tamsulosin (FLOMAX) 0.4 MG capsule; Take 1 capsule by mouth daily, Disp-90 capsule, R-1Normal  6. Non-seasonal allergic rhinitis due to pollen  Assessment & Plan:  Controlled on Flonase, continue  Orders:  -     fluticasone (FLONASE) 50 MCG/ACT nasal spray; 1 spray by Each Nostril route daily, Disp-16 g, R-1Normal  7. Chronic cough  Assessment & Plan:  Continue management per pulmonary  Orders:  -     albuterol sulfate HFA (PROVENTIL;VENTOLIN;PROAIR) 108 (90 Base) MCG/ACT inhaler; Inhale 2 puffs into the lungs every 6 hours as needed for Wheezing or Shortness of Breath, Disp-18 g, R-0Normal  -     tiotropium (SPIRIVA RESPIMAT) 2.5 MCG/ACT AERS inhaler;  Inhale 2 puffs into the lungs daily, Disp-1 each,
Obtained consent from patient. Per verbal order from NP [facility] Injection of Flucevax administered. Verified by me and Susy Davenport that this is the correct immunization/injection. Patient observed for 15 minutes with no adverse reaction.
Sarah Willis presents today for   Chief Complaint   Patient presents with    Medicare AWV    Blood Sugar Problem    Cholesterol Problem    Hypertension    Depression    Neck Pain     Pt c/o neck feeling stiff that started about 1 week. Pt c/o difficulty turning head. Pt states pain has improved. Is someone accompanying this pt? no    Is the patient using any DME equipment during [address]? no    Depression Screening:      10/27/2023    10:49 AM 7/21/2023     4:06 PM 5/23/2023     2:28 PM 2/17/2023     9:20 AM 1/25/2023    10:00 AM 11/23/2022    10:00 AM 10/26/2022     9:59 AM   PHQ-9 Questionaire   Little interest or pleasure in doing things 0 1 2 0 0 1 3   Feeling down, depressed, or hopeless 1 2 1 0 1 1 1   Trouble falling or staying asleep, or sleeping too much 1 2 0 0 1 3 3   Feeling tired or having little energy 1 3 2 0 1 2 3   Poor appetite or overeating 1 1 2 0 1 1 1   Feeling bad about yourself - or that you are a failure or have let yourself or your family down 0 2 1 0 0 0 3   Trouble concentrating on things, such as reading the newspaper or watching television 2 3 3 0 2 2 3   Moving or speaking so slowly that other people could have noticed. Or the opposite - being so fidgety or restless that you have been moving around a lot more than usual 1 2 1 0 0 0 1   Thoughts that you would be better off dead, or of hurting yourself in some way 0 0 0 0      PHQ-9 Total Score 7 16 12 0 6 10 18   If you checked off any problems, how difficult have these problems made it for you to do your work, take care of things at home, or get along with other people? 0  1 0           SHANAE 7-Anxiety        No data to display                 Learning Assessment:  No question data found. Fall Risk       No data to display                   Travel Screening:    Travel Screening       Question Response    Have you been in contact with someone who was sick?  No / Unsure    Do you have any of the following new or worsening
capsule by mouth daily Yes ANGE Levin   amLODIPine (NORVASC) 5 MG tablet Take 1 tablet by mouth once daily Yes ANGE Levin   atorvastatin (LIPITOR) 10 MG tablet TAKE 1 TABLET BY MOUTH NIGHTLY FOR  EXCESSIVE  FAT  IN  THE  BLOOD Yes ANGE Levin   tiotropium (SPIRIVA RESPIMAT) 2.5 MCG/ACT AERS inhaler Inhale 2 puffs into the lungs daily Yes Deana Austin MD   albuterol sulfate HFA (PROVENTIL;VENTOLIN;PROAIR) 108 (90 Base) MCG/ACT inhaler Inhale 2 puffs into the lungs every 6 hours as needed Yes Provider, MD Mela Kim (Including outside providers/suppliers regularly involved in providing care):   Patient Care Team:  ANGE Levin as PCP - General  ANGE Levin as PCP - Empaneled Provider     Reviewed and updated this visit:  Tobacco  Allergies  Meds  Problems  Med Hx  Surg Hx  Soc Hx  Fam Hx           LUISANA Levin

## 2024-01-17 DIAGNOSIS — R73.03 PREDIABETES: ICD-10-CM

## 2024-01-17 DIAGNOSIS — E78.5 HYPERLIPIDEMIA LDL GOAL <100: ICD-10-CM

## 2024-01-17 DIAGNOSIS — R07.9 CHEST PAIN, UNSPECIFIED TYPE: ICD-10-CM

## 2024-01-17 DIAGNOSIS — F32.1 MODERATE MAJOR DEPRESSION (HCC): ICD-10-CM

## 2024-01-17 DIAGNOSIS — I10 ESSENTIAL HYPERTENSION: ICD-10-CM

## 2024-01-21 DIAGNOSIS — I10 ESSENTIAL HYPERTENSION: ICD-10-CM

## 2024-01-21 DIAGNOSIS — F32.1 MODERATE MAJOR DEPRESSION (HCC): ICD-10-CM

## 2024-01-21 DIAGNOSIS — R07.9 CHEST PAIN, UNSPECIFIED TYPE: ICD-10-CM

## 2024-01-22 DIAGNOSIS — F32.1 MODERATE MAJOR DEPRESSION (HCC): ICD-10-CM

## 2024-01-22 DIAGNOSIS — R05.3 CHRONIC COUGH: ICD-10-CM

## 2024-01-22 DIAGNOSIS — J30.1 NON-SEASONAL ALLERGIC RHINITIS DUE TO POLLEN: ICD-10-CM

## 2024-01-22 DIAGNOSIS — R39.9 LOWER URINARY TRACT SYMPTOMS (LUTS): ICD-10-CM

## 2024-01-22 DIAGNOSIS — E78.5 HYPERLIPIDEMIA LDL GOAL <100: ICD-10-CM

## 2024-01-22 DIAGNOSIS — I10 ESSENTIAL HYPERTENSION: ICD-10-CM

## 2024-01-22 RX ORDER — ALBUTEROL SULFATE 90 UG/1
2 AEROSOL, METERED RESPIRATORY (INHALATION) EVERY 6 HOURS PRN
Qty: 18 G | Refills: 0 | Status: SHIPPED | OUTPATIENT
Start: 2024-01-22

## 2024-01-22 RX ORDER — AMLODIPINE BESYLATE 5 MG/1
5 TABLET ORAL DAILY
Qty: 30 TABLET | Refills: 0 | Status: SHIPPED | OUTPATIENT
Start: 2024-01-22

## 2024-01-22 RX ORDER — FLUOXETINE HYDROCHLORIDE 20 MG/1
20 CAPSULE ORAL DAILY
Qty: 30 CAPSULE | Refills: 0 | Status: SHIPPED | OUTPATIENT
Start: 2024-01-22

## 2024-01-22 RX ORDER — FLUTICASONE PROPIONATE 50 MCG
1 SPRAY, SUSPENSION (ML) NASAL DAILY
Qty: 16 EACH | Refills: 0 | Status: SHIPPED | OUTPATIENT
Start: 2024-01-22

## 2024-01-22 RX ORDER — TAMSULOSIN HYDROCHLORIDE 0.4 MG/1
0.4 CAPSULE ORAL DAILY
Qty: 30 CAPSULE | Refills: 0 | Status: SHIPPED | OUTPATIENT
Start: 2024-01-22

## 2024-01-22 RX ORDER — ATORVASTATIN CALCIUM 10 MG/1
TABLET, FILM COATED ORAL
Qty: 30 TABLET | Refills: 0 | Status: SHIPPED | OUTPATIENT
Start: 2024-01-22

## 2024-01-22 NOTE — TELEPHONE ENCOUNTER
Patient need a medication refill for all his medications he has moved to [place]. He would like his medications sent [facility] in [place]. He would like to be called back once his medications are sent. Please advise

## 2024-03-18 DIAGNOSIS — I10 ESSENTIAL HYPERTENSION: ICD-10-CM

## 2024-03-18 DIAGNOSIS — E78.5 HYPERLIPIDEMIA LDL GOAL <100: ICD-10-CM

## 2024-03-18 RX ORDER — AMLODIPINE BESYLATE 5 MG/1
5 TABLET ORAL DAILY
Qty: 30 TABLET | Refills: 0 | OUTPATIENT
Start: 2024-03-18

## 2024-03-18 RX ORDER — ATORVASTATIN CALCIUM 10 MG/1
TABLET, FILM COATED ORAL
Qty: 30 TABLET | Refills: 0 | OUTPATIENT
Start: 2024-03-18

## 2024-05-18 DIAGNOSIS — R05.3 CHRONIC COUGH: ICD-10-CM

## 2024-05-20 RX ORDER — ALBUTEROL SULFATE 90 UG/1
AEROSOL, METERED RESPIRATORY (INHALATION)
Qty: 18 G | Refills: 0 | OUTPATIENT
Start: 2024-05-20

## 2024-05-20 NOTE — TELEPHONE ENCOUNTER
Refill declined due to not being seen and no labs past 6 months, please contact patient to schedule for overdue f/u below and also remind him to have fasting labs drawn prior to appt. Thanks    Follow-up and Dispositions    Return in about 3 months (around 1/27/2024) for prediabetes, cholesterol, blood pressure, lab results.